# Patient Record
Sex: MALE | Race: ASIAN
[De-identification: names, ages, dates, MRNs, and addresses within clinical notes are randomized per-mention and may not be internally consistent; named-entity substitution may affect disease eponyms.]

---

## 2018-06-23 ENCOUNTER — HOSPITAL ENCOUNTER (EMERGENCY)
Dept: HOSPITAL 74 - JER | Age: 63
Discharge: HOME | End: 2018-06-23
Payer: COMMERCIAL

## 2018-06-23 VITALS — TEMPERATURE: 98.3 F | DIASTOLIC BLOOD PRESSURE: 65 MMHG | HEART RATE: 63 BPM | SYSTOLIC BLOOD PRESSURE: 141 MMHG

## 2018-06-23 VITALS — BODY MASS INDEX: 37.9 KG/M2

## 2018-06-23 DIAGNOSIS — E66.9: ICD-10-CM

## 2018-06-23 DIAGNOSIS — Z79.82: ICD-10-CM

## 2018-06-23 DIAGNOSIS — I25.10: ICD-10-CM

## 2018-06-23 DIAGNOSIS — K61.1: Primary | ICD-10-CM

## 2018-06-23 DIAGNOSIS — Z95.5: ICD-10-CM

## 2018-06-23 DIAGNOSIS — I10: ICD-10-CM

## 2018-06-23 DIAGNOSIS — Z95.0: ICD-10-CM

## 2018-06-23 DIAGNOSIS — E78.00: ICD-10-CM

## 2018-06-23 PROCEDURE — 0D9Q0ZZ DRAINAGE OF ANUS, OPEN APPROACH: ICD-10-PCS

## 2018-06-23 NOTE — CONSULT
Consult


Consult Specialty:: general surgery 


Reason for Consultation:: tender perianal mass





- History of Present Illness


Chief Complaint: perianal pain


History of Present Illness: 





61 yo male with PMH MI s/p 1 stent, pacemaker, HTN, obesity and HLD present to 

the emergency department with swelling near the anus region for the past two 

days. The patient reports a progressively worsening constant pain that is 

accompanied with burning. The patient reports he followed up with Dr. Ashish Jacob (Gastroenterologist) office, where he had a colonoscopy, states he 

was prescribed corticosteroid suppository and was recommended to use milk of 

magnesia. The patient states s/p the use of corticosteroid his symptoms 

worsened. The patient reports contacting Dr. Hudson yesterday, who recommended 

him to the ED if the pain worsened.  The patient reports an history of 

occasional constipation. we were asked to assess. 








- History Source


History Provided By: Patient, Medical Record


Limitations to Obtaining History: No Limitations





- Past Medical History


Cardio/Vascular: Yes: CAD, HTN, Hyperlipdemia





- Past Surgical History


Past Surgical History: Yes: Stent





- Alcohol/Substance Use


Hx Alcohol Use: No





- Smoking History


Smoking history: Never smoked


Have you smoked in the past 12 months: No





- Social History


History of Recent Travel: Yes





Home Medications





- Home Medications


Home Medications: 


Ambulatory Orders





Aspirin [ASA -] 81 mg PO DAILY 06/23/18 


Candesartan Cilexetil [Atacand -] 16 mg PO DAILY 06/23/18 


Ezetimibe [Zetia] 10 mg PO DAILY 06/23/18 


Metoprolol Tartrate [Lopressor -] 25 mg PO DAILY 06/23/18 











Review of Systems





- Review of Systems


Constitutional: denies: Chills, Fever


Eyes: denies: Blurred Vision, Recent Change in Vision


HENT: denies: Difficult Swallowing, Throat Pain


Cardiovascular: denies: Chest Pain, Palpitations


Respiratory: denies: Cough, SOB


Gastrointestinal: denies: Abdominal Pain


Genitourinary: denies: Discharge, Dysuria


Breasts: reports: No Symptoms Reported.  denies: Pain


Musculoskeletal: reports: Back Pain


Integumentary: denies: Erythema, Lesions, Rash


Neurological: denies: Seizure, Syncope


Endocrine: denies: Unexplained Weight Gain, Unexplained Weight Loss


Hematology/Lymphatic: denies: Easily Bruised, Excessive Bleeding


Psychiatric: denies: Anxiety, Depression





Physical Exam


Vital Signs: 


 Vital Signs











Temperature  98.3 F   06/23/18 15:27


 


Pulse Rate  63   06/23/18 15:27


 


Respiratory Rate  17   06/23/18 15:27


 


Blood Pressure  141/65   06/23/18 15:27


 


O2 Sat by Pulse Oximetry (%)  96   06/23/18 15:27











Constitutional: Yes: Well Nourished, No Distress, Calm


Eyes: Yes: Conjunctiva Clear, EOM Intact


HENT: Yes: Atraumatic, Normocephalic


Neck: Yes: Supple, Trachea Midline


Cardiovascular: Yes: Regular Rate and Rhythm, S1, S2


Respiratory: Yes: Regular, CTA Bilaterally


Gastrointestinal: Yes: Normal Bowel Sounds, Soft.  No: Tenderness


...Rectal Exam: Yes: Hemorrhoids/External, Sphincter Tone Normal, Other (tender 

mass at)


Renal/: No: CVA Tenderness - Left, CVA Tenderness - Right


Musculoskeletal: No: Muscle Pain, Muscle Weakness


Extremities: No: Cold, Cool


Integumentary: No: Jaundice, Rash


Neurological: Yes: Alert, Oriented


Psychiatric: Yes: Alert, Oriented





Problem List





- Problems


(1) Perianal abscess


Assessment/Plan: 


63yo male MMP with perianal abscess 





Bedside I&D of perianal abscess 


adequate analgesia 


sits baths after elimination 


f/u this Wednesday in [facility] for wound check


Discussed with patient risks, benefits and alternatives of I&D of perianal 

abscess, including but not limited to bleeding, infection, injury to adjacent 

structures, leak or injury, intraabdominal abscess, need for further procedures

, death; alternatives include antibiotics, delayed or no surgery - risks of 

this include failure of nonoperative therapy, perforation, sepsis, recurrence, 

death.


Patient desires to proceed with operation - will take to OR for above. Informed 

consent signed for same.


Code(s): K61.0 - ANAL ABSCESS   





(2) HLD (hyperlipidemia)


Code(s): E78.5 - HYPERLIPIDEMIA, UNSPECIFIED   


Qualifiers: 


   Hyperlipidemia type: unspecified   Qualified Code(s): E78.5 - Hyperlipidemia

, unspecified   





(3) HTN (hypertension)


Code(s): I10 - ESSENTIAL (PRIMARY) HYPERTENSION   


Qualifiers: 


   Hypertension type: essential hypertension   Qualified Code(s): I10 - 

Essential (primary) hypertension   





(4) CAD (coronary atherosclerotic disease)


Code(s): I25.10 - ATHSCL HEART DISEASE OF NATIVE CORONARY ARTERY W/O ANG PCTRS 

  





(5) Obesity (BMI 30-39.9)


Code(s): E66.9 - OBESITY, UNSPECIFIED

## 2018-06-23 NOTE — PROC
Incision and Drainage


Indication/Location: Perianal abscess at 1 oclock


Risks and Benefits Explained: Yes


Consent on Chart: Yes


Betadine cleansed: Yes


Anesthesia: 2% Lidocaine


Blade Size: 10


Drainage: purulent material 10ml


Irrigated with Normal Saline: Yes


Iodinated Packin in


Plain packing: Yes





- Remarks


Remarks: 





standard I&D, return of foul smelling pus

## 2018-06-23 NOTE — PDOC
History of Present Illness





- General


History Source: Patient


Exam Limitations: No Limitations





- History of Present Illness


Initial Comments: 





06/23/18 17:00


The patient is a 62-year-old male with past medical history of heart attack (s/

p 1 stent), pacemaker, HTN and HLD present to the emergency department with 

swelling near the anus region for the past two days. The patient reports a 

progressively worsening constant pain that is accompanied with burning. The 

patient reports he followed up with Dr. Ashish Jacob (Gastroenterologist) 

office, where he had a colonoscopy, states he was prescribed corticosteroid 

suppository and was recommended to use milk of magnesia. The patient states s/p 

the use of corticosteroid his symptoms worsened. The patient reports contacting 

Dr. Hudson yesterday, who recommended him to the ED if the pain worsened.  The 

patient reports an history of occasional constipation. 





The patient denies a prior similar incident. Denies hematochezia.  Denies 

diarrhea or constipation. Denies fever, chills, cough or a headache. Denies 

chest pain, shortness of breath or palpitation. Denies back pain. Denies dysuria

, hematuria, frequency or urgency to urinate. 





Allergies: NKDA


Social history: None reported.


Surgical history: Cardiac stent and pacemaker.  


PCP: Nusrat Avalos MD 








<Jennifer Acevedo - Last Filed: 06/23/18 17:50>





<Byron Mishra - Last Filed: 06/23/18 18:59>





- General


Chief Complaint: Hemorrhoids


Stated Complaint: Rectal PAIN


Time Seen by Provider: 06/23/18 16:17





Past History





<Jennifer Acevedo - Last Filed: 06/23/18 17:50>





- Past Medical History


CVA: No


COPD: No


DVT: No


Dementia: No





- Surgical History


Cardiac Surgery: Yes (PACEMAKER)





- Immunization History


Immunization Up to Date: Yes





- Suicide/Smoking/Psychosocial Hx


Smoking History: Never smoked


Have you smoked in the past 12 months: No


Information on smoking cessation initiated: No


Hx Alcohol Use: No


Drug/Substance Use Hx: No


Substance Use Type: None





<Byron Mishra - Last Filed: 06/23/18 18:59>





- Past Medical History


Home Medications: 


Ambulatory Orders





Aspirin [ASA -] 81 mg PO DAILY 06/23/18 


Candesartan Cilexetil [Atacand -] 16 mg PO DAILY 06/23/18 


Ezetimibe [Zetia] 10 mg PO DAILY 06/23/18 


Metoprolol Tartrate [Lopressor -] 25 mg PO DAILY 06/23/18 











**Review of Systems





- Review of Systems


Able to Perform ROS?: Yes


Comments:: 





06/23/18 16:52


CONSTITUTIONAL: No fever, no chills, no fatigue


EYES: No visual changes


ENT: No ear pain, no sore throat


CARDIOVASCULAR: No chest pain, no palpitations


RESPIRATORY: No cough, no SOB


GI: No abdominal pain, no nausea, no vomiting, no constipation, no diarrhea


GENITOURINARY: (+) Swelling to the anus region. No dysuria, no frequency, no 

hematuria


MUSKULOSKELETAL: No backpain, no joint pain, no myalgias


SKIN: No rash


NEURO: No headache














<Jennifer Acevedo - Last Filed: 06/23/18 17:50>





*Physical Exam





- Vital Signs


 Last Vital Signs











Temp Pulse Resp BP Pulse Ox


 


 98.3 F   63   17   141/65   96 


 


 06/23/18 15:27  06/23/18 15:27  06/23/18 15:27  06/23/18 15:27  06/23/18 15:27














- Physical Exam


Comments: 





06/23/18 16:53





CONSTITUTIONAL: Well-appearing; well-nourished; in no apparent distress


HEAD: Normocephalic; atraumatic


EYES: PERRL; EOM intact


ENMT: External appears normal; normal oropharynx


NECK: Supple; non-tender; no cervical lymphadenopathy


CARD: Normal S1, S2; no murmurs, rubs, or gallops


RESP: Normal chest excursion with respiration; breath sounds clear and equal 

bilaterally; no wheezes, rhonchi, or rales








EXT: Normal ROM in all four extremities; non-tender to palpation; distal pulses 

intact


SKIN: Warm, dry, no rash


NEURO: No focal neurological deficiencies.





<Jennifer Acevedo - Last Filed: 06/23/18 17:50>





- Vital Signs


 Last Vital Signs











Temp Pulse Resp BP Pulse Ox


 


 98.3 F   63   17   141/65   96 


 


 06/23/18 15:27  06/23/18 15:27  06/23/18 15:27  06/23/18 15:27  06/23/18 15:27














- Physical Exam


Comments: 








06/23/18 17:59


GI:  soft, nt, nd, bs-nl, no hernias;  VA: + large soft tissue mass to the maria elena-

anal area, ttp.  no rectal masses.











<Byron Mishra - Last Filed: 06/23/18 18:59>





Medical Decision Making





- Medical Decision Making





06/23/18 17:51


Call sent to the on-call doctor at [facility] @ [phone]. 


[facility] called at 5:20 pm 


[facility] called at 5:47 pm. 


Case discussed with Dr. Maldonado at 5:52 PM. 








<Jennifer Acevedo - Last Filed: 06/23/18 17:50>





- Medical Decision Making








06/23/18 18:57


62-year-old male with history of CAD, hypertension presented to the ER with a 

painful perianal mass. Dr. Smith of surgery evaluated the patient at bedside 

and performed incision and drainage of a perianal abscess. Packing was inserted 

abscess cavity was irrigated with normal saline. Gauze dressing applied. 

Patient instructed on wound care and will be discharged with follow-up.





<Byron Mishra - Last Filed: 06/23/18 18:59>





*DC/Admit/Observation/Transfer





- Attestations


Scribe Attestion: 





06/23/18 16:53





Documentation prepared by Jennifer Acevedo, acting as medical scribe for Byron Mishra MD. 











<Jennifer Acevedo - Last Filed: 06/23/18 17:50>





- Attestations


Physician Attestion: 





06/23/18 17:59


The documentation was prepared by the scribe under my direct supervision. I 

have reviewed the documentation which correctly represents the findings, 

medical decision-making and critical action taken by me.





<Byron Mishra - Last Filed: 06/23/18 18:59>


Diagnosis at time of Disposition: 


 Perianal abscess








- Discharge Dispostion


Disposition: HOME


Condition at time of disposition: Stable





- Referrals


Referrals: 


Nusrat Hudson MD [Primary Care Provider] - 


Evens Buckley MD [Staff Physician] - 





- Patient Instructions


Printed Discharge Instructions:  DI for Anal Abscess





- Post Discharge Activity

## 2018-06-27 NOTE — PDOC
Patient Follow-up (Call Back)





- Post ED Follow - Up


Condition at time of discharge: Stable


Disposition at time of original discharge: HOME


Reason for Call Back: Abnwl. Microbiology ((+) wound culture.  Patient had 

perianal abscess drained by Dr. Buckley.  Patient has follow up with Dr. Buckley 

today.  Will not call out abx yet.  WIll wait for follow up note.)

## 2018-07-28 ENCOUNTER — HOSPITAL ENCOUNTER (OUTPATIENT)
Dept: HOSPITAL 74 - JER | Age: 63
Setting detail: OBSERVATION
LOS: 2 days | Discharge: HOME | End: 2018-07-30
Attending: INTERNAL MEDICINE | Admitting: INTERNAL MEDICINE
Payer: COMMERCIAL

## 2018-07-28 VITALS — BODY MASS INDEX: 37.5 KG/M2

## 2018-07-28 DIAGNOSIS — E78.5: ICD-10-CM

## 2018-07-28 DIAGNOSIS — Z95.0: ICD-10-CM

## 2018-07-28 DIAGNOSIS — I25.10: ICD-10-CM

## 2018-07-28 DIAGNOSIS — R60.0: ICD-10-CM

## 2018-07-28 DIAGNOSIS — I10: ICD-10-CM

## 2018-07-28 DIAGNOSIS — E87.1: Primary | ICD-10-CM

## 2018-07-28 DIAGNOSIS — Z79.82: ICD-10-CM

## 2018-07-28 DIAGNOSIS — G47.33: ICD-10-CM

## 2018-07-28 DIAGNOSIS — Z95.5: ICD-10-CM

## 2018-07-28 DIAGNOSIS — I25.2: ICD-10-CM

## 2018-07-28 LAB
ALBUMIN SERPL-MCNC: 3.6 G/DL (ref 3.4–5)
ALP SERPL-CCNC: 50 U/L (ref 45–117)
ALT SERPL-CCNC: 28 U/L (ref 12–78)
ANION GAP SERPL CALC-SCNC: 5 MMOL/L (ref 8–16)
APPEARANCE UR: CLEAR
AST SERPL-CCNC: 31 U/L (ref 15–37)
BASOPHILS # BLD: 0.8 % (ref 0–2)
BILIRUB SERPL-MCNC: 0.7 MG/DL (ref 0.2–1)
BILIRUB UR STRIP.AUTO-MCNC: NEGATIVE MG/DL
BNP SERPL-MCNC: 136.73 PG/ML (ref 5–125)
BUN SERPL-MCNC: 7 MG/DL (ref 7–18)
CALCIUM SERPL-MCNC: 8.3 MG/DL (ref 8.5–10.1)
CHLORIDE SERPL-SCNC: 89 MMOL/L (ref 98–107)
CO2 SERPL-SCNC: 32 MMOL/L (ref 21–32)
COLOR UR: (no result)
CREAT SERPL-MCNC: 1 MG/DL (ref 0.7–1.3)
CREAT UR-MCNC: 37.6 MG/DL (ref 20–370)
DEPRECATED RDW RBC AUTO: 14 % (ref 11.9–15.9)
EOSINOPHIL # BLD: 6.2 % (ref 0–4.5)
GLUCOSE SERPL-MCNC: 88 MG/DL (ref 74–106)
HCT VFR BLD CALC: 43.5 % (ref 35.4–49)
HGB BLD-MCNC: 15 GM/DL (ref 11.7–16.9)
KETONES UR QL STRIP: NEGATIVE
LEUKOCYTE ESTERASE UR QL STRIP.AUTO: NEGATIVE
LYMPHOCYTES # BLD: 32.7 % (ref 8–40)
MCH RBC QN AUTO: 29.9 PG (ref 25.7–33.7)
MCHC RBC AUTO-ENTMCNC: 34.5 G/DL (ref 32–35.9)
MCV RBC: 86.9 FL (ref 80–96)
MONOCYTES # BLD AUTO: 13.1 % (ref 3.8–10.2)
NEUTROPHILS # BLD: 47.2 % (ref 42.8–82.8)
NITRITE UR QL STRIP: NEGATIVE
OSMOLALITY SERPL: 268 MOSM/KG (ref 278–305)
PH UR: 7 [PH] (ref 5–8)
PLATELET # BLD AUTO: 233 K/MM3 (ref 134–434)
PMV BLD: 6.5 FL (ref 7.5–11.1)
POTASSIUM SERPLBLD-SCNC: 4.6 MMOL/L (ref 3.5–5.1)
PROT SERPL-MCNC: 6.9 G/DL (ref 6.4–8.2)
PROT UR QL STRIP: NEGATIVE
PROT UR QL STRIP: NEGATIVE
PROT UR STRIP-MCNC: < 5 MG/DL
RBC # BLD AUTO: 5.01 M/MM3 (ref 4–5.6)
SODIUM SERPL-SCNC: 126 MMOL/L (ref 136–145)
SP GR UR: 1 (ref 1–1.03)
UROBILINOGEN UR STRIP-MCNC: NEGATIVE MG/DL (ref 0.2–1)
WBC # BLD AUTO: 5.4 K/MM3 (ref 4–10)

## 2018-07-28 PROCEDURE — G0378 HOSPITAL OBSERVATION PER HR: HCPCS

## 2018-07-28 PROCEDURE — 3E033GC INTRODUCTION OF OTHER THERAPEUTIC SUBSTANCE INTO PERIPHERAL VEIN, PERCUTANEOUS APPROACH: ICD-10-PCS | Performed by: INTERNAL MEDICINE

## 2018-07-28 PROCEDURE — A9502 TC99M TETROFOSMIN: HCPCS

## 2018-07-28 RX ADMIN — HEPARIN SODIUM SCH UNIT: 5000 INJECTION, SOLUTION INTRAVENOUS; SUBCUTANEOUS at 21:14

## 2018-07-28 RX ADMIN — PANTOPRAZOLE SODIUM SCH MG: 40 INJECTION, POWDER, FOR SOLUTION INTRAVENOUS at 18:31

## 2018-07-28 RX ADMIN — ATORVASTATIN CALCIUM SCH MG: 20 TABLET, FILM COATED ORAL at 21:12

## 2018-07-28 NOTE — HP
CHIEF COMPLAINT: Weakness, abdominal/chest discomfort





PCP: Dr. Hudson  





HISTORY OF PRESENT ILLNESS:


The patient is a 61 yo m w/ PMH HTN, HLD, CAD s/p MI w/ stenting and PPM 

placement who was sent to the ED by his PCP for evaluation of weakness, 

diaphoresis and chest/abdominal discomfort. The patient states that he was 

laying in his couch this morning when he began to experience a discomfort which 

began in his epigastrium and radiated to the center of his chest. The patient 

described the sensation as a burning discomfort. The patient associates this 

sensation with SOB. The sensation had no exacerbating or alleviating factors 

and resolved after approx 2-3 minutes after the patient drank some water. The 

patient has had similar episodes of this discomfort in the past. He also states 

that these previous occurrences have been precipitated by exertion. The patient 

also endorses taking a large amout of NSAIDS during the past week for back 

pain. He reports taking approx 6 pills per day of mixed Advil and Motrin. The 

patient denies chest pain, abdominal pain, dysuria. 





ER course was notable for:


(1) Sodium 126, Cl 88


(2) troponin negative x2


(3)








PAST MEDICAL HISTORY:


see HPI





PAST SURGICAL HISTORY:


Stent placement 2007





Social History:


Smoking: former smoker. Quit 30 years ago


Alcohol: denies


Drugs: denies





Family History:


Allergies





No Known Allergies Allergy (Verified 07/28/18 10:50)


 








HOME MEDICATIONS:


 Home Medications











 Medication  Instructions  Recorded


 


Aspirin [ASA -] 81 mg PO DAILY 06/23/18


 


Candesartan Cilexetil [Atacand -] 16 mg PO DAILY 06/23/18


 


Ezetimibe [Zetia] 10 mg PO DAILY 06/23/18


 


Metoprolol Tartrate [Lopressor -] 25 mg PO DAILY 06/23/18


 


Pitavastatin Calcium [Livalo] 4 mg PO DAILY 07/28/18








REVIEW OF SYSTEMS


CONSTITUTIONAL: 


Absent:  fever, chills, diaphoresis, generalized weakness, malaise, loss of 

appetite, weight change


HEENT: 


Absent:  rhinorrhea, nasal congestion, throat pain, throat swelling, difficulty 

swallowing, mouth swelling, ear pain, eye pain, visual changes


CARDIOVASCULAR: 


Absent: syncope, palpitations, irregular heart rate, lightheadedness


RESPIRATORY: 


Absent: cough, orthopnea, wheezing, stridor, hemoptysis


GASTROINTESTINAL:


Absent: abdominal distension, nausea, vomiting, diarrhea, constipation, melena, 

hematochezia


GENITOURINARY: 


Absent: dysuria, frequency, urgency, hesitancy, hematuria, flank pain, genital 

pain


MUSCULOSKELETAL: 


Absent: myalgia, arthralgia, joint swelling, back pain, neck pain


SKIN: 


Absent: rash, itching, pallor


HEMATOLOGIC/IMMUNOLOGIC: 


Absent: easy bleeding, easy bruising, lymphadenopathy, frequent infections


ENDOCRINE:


Absent: unexplained weight gain, unexplained weight loss, heat intolerance, 

cold intolerance


NEUROLOGIC: 


Absent: headache, focal weakness or paresthesias, dizziness, unsteady gait, 

seizure, mental status changes, bladder or bowel incontinence


PSYCHIATRIC: 


Absent: anxiety, depression, suicidal or homicidal ideation, hallucinations.








PHYSICAL EXAMINATION


 Vital Signs - 24 hr











  07/28/18 07/28/18 07/28/18





  10:48 14:30 16:20


 


Temperature 97.9 F  


 


Pulse Rate 60  


 


Pulse Rate [  60 60





Apical]   


 


Respiratory 18  





Rate   


 


Blood Pressure 125/74  


 


Blood Pressure  134/73 136/85





[Left Arm]   


 


O2 Sat by Pulse 99 95 94 L





Oximetry (%)   











GENERAL: Awake, alert, and fully oriented, in no acute distress.


HEAD: Normal with no signs of trauma.


EYES: Pupils equal, round and reactive to light, extraocular movements intact, 

sclera anicteric, conjunctiva clear. No lid lag.


EARS, NOSE, THROAT: oropharynx clear without exudates. Moist mucous membranes.


NECK: Normal range of motion, supple without lymphadenopathy, JVD, or masses.


LUNGS: Breath sounds equal, clear to auscultation bilaterally. No wheezes, and 

no crackles. No accessory muscle use.


HEART: Regular rate and rhythm, normal S1 and S2 without murmur, rub or gallop.


ABDOMEN: Soft, nontender, not distended, normoactive bowel sounds, no guarding, 

no rebound, no masses.  No hepatomegaly or  splenomegaly. 


LOWER EXTREMITIES: 2+ pulses, warm, well-perfused. No calf tenderness. No 

peripheral edema. 


NEUROLOGICAL:  Cranial nerves II-X intact. Normal speech. strength 5/5 in all 4 

limbs


SKIN: Warm, dry, normal turgor, no rashes or lesions noted, normal capillary 

refill.


 


 Laboratory Results - last 24 hr











  07/28/18 07/28/18 07/28/18





  11:26 11:26 11:58


 


WBC  5.4  


 


RBC  5.01  


 


Hgb  15.0  


 


Hct  43.5  


 


MCV  86.9  


 


MCH  29.9  


 


MCHC  34.5  


 


RDW  14.0  


 


Plt Count  233  


 


MPV  6.5 L  


 


Absolute Neuts (auto)  2.6  


 


Neutrophils %  47.2  


 


Lymphocytes %  32.7  


 


Monocytes %  13.1 H  


 


Eosinophils %  6.2 H  


 


Basophils %  0.8  


 


Nucleated RBC %  0  


 


Sodium   126 L 


 


Potassium   4.6 


 


Chloride   89 L 


 


Carbon Dioxide   32 


 


Anion Gap   5 L 


 


BUN   7 


 


Creatinine   1.0 


 


Creat Clearance w eGFR   > 60 


 


Random Glucose   88 


 


Calcium   8.3 L 


 


Total Bilirubin   0.7 


 


AST   31 


 


ALT   28 


 


Alkaline Phosphatase   50 


 


Creatine Kinase   357 H 


 


Creatine Kinase Index   1.5 


 


CK-MB (CK-2)   5.61 H 


 


Troponin I   < 0.02 


 


B-Natriuretic Peptide   136.73 H 


 


Total Protein   6.9 


 


Albumin   3.6 


 


TSH   0.50 


 


Urine Color    Straw


 


Urine Appearance    Clear


 


Urine pH    7.0


 


Ur Specific Gravity    1.003


 


Urine Protein    Negative


 


Urine Glucose (UA)    Negative


 


Urine Ketones    Negative


 


Urine Blood    Negative


 


Urine Nitrite    Negative


 


Urine Bilirubin    Negative


 


Urine Urobilinogen    Negative


 


Ur Leukocyte Esterase    Negative


 


Ur Random Sodium   


 


Urine Creatinine   














  07/28/18





  11:58


 


WBC 


 


RBC 


 


Hgb 


 


Hct 


 


MCV 


 


MCH 


 


MCHC 


 


RDW 


 


Plt Count 


 


MPV 


 


Absolute Neuts (auto) 


 


Neutrophils % 


 


Lymphocytes % 


 


Monocytes % 


 


Eosinophils % 


 


Basophils % 


 


Nucleated RBC % 


 


Sodium 


 


Potassium 


 


Chloride 


 


Carbon Dioxide 


 


Anion Gap 


 


BUN 


 


Creatinine 


 


Creat Clearance w eGFR 


 


Random Glucose 


 


Calcium 


 


Total Bilirubin 


 


AST 


 


ALT 


 


Alkaline Phosphatase 


 


Creatine Kinase 


 


Creatine Kinase Index 


 


CK-MB (CK-2) 


 


Troponin I 


 


B-Natriuretic Peptide 


 


Total Protein 


 


Albumin 


 


TSH 


 


Urine Color 


 


Urine Appearance 


 


Urine pH 


 


Ur Specific Gravity 


 


Urine Protein  < 5


 


Urine Glucose (UA) 


 


Urine Ketones 


 


Urine Blood 


 


Urine Nitrite 


 


Urine Bilirubin 


 


Urine Urobilinogen 


 


Ur Leukocyte Esterase 


 


Ur Random Sodium  33


 


Urine Creatinine  37.6











ASSESSMENT/PLAN:


The patient is a 61 yo m w/ PMH HTN, CAD s/p MI and stenting who comes into the 

ED c/o abdominal and chest discomfort.





#Chest/abdominal discomfort possibly 2/2 GERD  r/o ACS


   -trop negative x2, Rpt in AM


   -EKG with no significant change from previous


   -Protonix 40mg daily


   -Rpt EKG


   -cardio consult





#Hyponatremia possibly 2/2 fluid overload vs diuretic use 


   -Serum mOsm


   -urine mOsm


   -urine lytes


   -fluid restrict 1L


   -Holding diuretics until fluid status assessed





#DEZ


   -patient airway severely restricted


   -Mallampati 5


   -No SOB at this time, but was observed snoring while awake and sitting up in 

bed


   -BiPap in PM according to patient's sleep study from 1/13/18





#FEN


   -no fluids indicated; fluid restriction


   -monitor sodium as above


   -regular diet





#Prophy


   -Heparin SQ 5k units TID





#Dispo


   -admit tele obs


   -medications to be reconciled with [facility] ([phone])





Visit type





- Emergency Visit


Emergency Visit: Yes


ED Registration Date: 07/28/18


Care time: The patient presented to the Emergency Department on the above date 

and was hospitalized for further evaluation of their emergent condition.





- New Patient


This patient is new to me today: Yes


Date on this admission: 07/29/18





- Critical Care


Critical Care patient: No





Hospitalist Screening





- Colonoscopy Questionnaire


Colonoscopy Questionnaire: 





Colonoscopy Questionnaire








-   Patient:


50 - 75 years old and never had a screening colonoscopy: Unknown


History of colon or rectal polyps, or CA: Unknown


History of IBD, Crohn's disease or UC: Unknown


History of abdominal radiation therapy as a child: Unknown





-   Relative:


1 with colon or rectal CA, or polyps at age 60 or younger: Unknown


Colon or rectal CA diagnosed at age 45 or younger: Unknown


Multiple relatives with colon or rectal CA: Unknown





-   Outcome:


Screening Result: Negative Screen

## 2018-07-28 NOTE — PN
Teaching Attending Note


Name of Resident: Brdoerick Carpenter





ATTENDING PHYSICIAN STATEMENT





I saw and evaluated the patient.


I reviewed the resident's note and discussed the case with the resident.


I agree with the resident's findings and plan as documented.








SUBJECTIVE:


CC: generalized weakness, and sweating x 3 min 


61 y/o gentleman with h/o HTN, HLP, CAD s/p MI and stenting, PPM, and severe 

obstructive sleep apnea  who presented with an episode of generalized weakness, 

and sweating  for 3 min this am, along with a weird burning feeling in his 

abdomen moving up and down his chest. during this episode he could not stay 

still or find a comfortable position.  He felt SOB and had HA during episode. 

denies palpitations , or chest pain , or fever /chills. episode happened while 

sitting on the couch. 


he reports runny nose few days ago, now resolved.  his Last MI many years ago, 

felt like chest pressure  with radiation to L arm and neck. 


He reports LARA and burning in his chest with exertion.  He was diagnosed with 

severe DEZ in Jan/18 and was prescribed BIPAP 25/21 but never got it. 


he reports LE edema for which he takes a water pill ( not sure of name). Edema 

has been stable. denies fever , chills, dysuria, cough , SOB, diarrhea , and 

visual changes. 


reports excessive  NSAIDS use for lower back pain 





OBJECTIVE:


NAD, awake , snoring while flat in bed. Obese full neck, possible palpable 

submandibular glands vs lymph noses. NO JVD. fullness of oropharynx with soft 

palate 


CV: RRR, nl S1, S2.


Lungs: CTAB 


Ext: 2+ edema . Area of hyperpigementation and increased warmth with varicose 

veins on  R medial leg. no fungal infection among toes. 


Neuro: EOMI, round equal pupils , reactive to light , no facial droop. tongue 

and uvula at mid line , facial sensation is nL. Strength 5/5 in upper and lower 

extremities proximally and distally. 





ASSESSMENT AND PLAN:





61 y/o gentleman with h/o HTN, HLP, MI, PPM, and severe obstructive sleep apnea

  who presented with an episode of generalized weakness, and sweating  for 3 

min. 


1- Episode of chest burning and generalized weakness. not clear of etiology. 

Anginal pain ( unstable angina) is in DDX , as well as GERD ( recent use of 

NSAIDs ) . 


unlikely he had arrhythmias.


- EKG with sinus rhythm, occasionally paced.


- repeat EKG and trop now 


- tele monitoring. 


- Not sure of last stress and echo. will d/w card if a stress test is needed 

given his exertional symptoms . If yes, will discuss timing ( inpt vs out pt ).





2-  Hyponatremia:  likely hypotonic hyponatremia. He is either euvolemic vs 

mild volume overload. Urine Na of 33 indicates renal loss from diuretics 

probably, rather than heart failure. 


- check urine os and serum Osm


- calculate feNA 


- restrict free water to 1 L a day 


- hold his diuretics 





3- LE edema: might have degree of R sided systolic dysfunction in the setting 

of severe untreated DEZ. 


- hold his  diuretics for now given hyponatremia. 


- will need an echo if not done recently ( can be done as out pt) 


- f/u with card 





4- Severe untreated DEZ: 


- Use BIPAP 25/21 q HS 


- f/u with pulm as out pt .


- he was advised not to drive in Jan 





5- Possible enlarged lymph nodes vs submandibular glands : w/u as out pt with 

ENT  


    


7- HTN: cont meds 


8- HLP , cont meds 





Dispo: Observation

## 2018-07-28 NOTE — PDOC
History of Present Illness





- General


History Source: Patient


Exam Limitations: No Limitations





- History of Present Illness


Initial Comments: 





07/28/18 11:54


The patient is a 62 year old male, with a significant past medical history of 

hypertension, hyperlipidemia, CAD, MI(2007), cardiac stents, pacemaker, sent to 

the emergency department by PCP Dr. Hudson for evaluation of weakness, tremors 

and diaphoresis earlier this morning. The patient reports waking up sweating 

and shaking earlier today. He reports his symptoms are not similar to his prior 

MI(had cp during that episode). Today, he reports some lower extremity edema, 

but denies any associated chest pain, shortness of breath, lightheadedness or 

palpitations. Patient reports nausea, but denies abdominal pain, vomiting, 

diarrhea, or constipation. He reports informing his wife, who gave him some 

water which relieved his nausea. He any fever, chills, weight loss, changes in 

appetite, headache, or dizziness. He denies any recent travel or sick contacts. 

He reports family history of hyperthyroidism. Patient is on Aspirin.





Allergies: NKDA


Past Surgical History: Cardiac stents, pacemaker


Social History: Non smoker. No ETOH or recreational drug use


PCP: Dr. Hudson


Cardiologist: Dr. Ibarra








<Julio Bran - Last Filed: 07/28/18 13:28>





<Kassie Nascimento - Last Filed: 07/28/18 15:13>





- General


Chief Complaint: Weakness


Stated Complaint: SENT BY PCP


Time Seen by Provider: 07/28/18 11:01





Past History





<Julio Bran - Last Filed: 07/28/18 13:28>





- Past Medical History


Cardiac Disorders: Yes


CVA: No


COPD: No


DVT: No


Dementia: No


HTN: Yes


Hypercholesterolemia: Yes





- Surgical History


Cardiac Surgery: Yes (PACEMAKER 2007)





- Immunization History


Immunization Up to Date: Yes





- Suicide/Smoking/Psychosocial Hx


Smoking History: Never smoked


Have you smoked in the past 12 months: No


Hx Alcohol Use: No


Drug/Substance Use Hx: No


Substance Use Type: None





<Kassie Nascimento - Last Filed: 07/28/18 15:13>





- Past Medical History


Allergies/Adverse Reactions: 


 Allergies











Allergy/AdvReac Type Severity Reaction Status Date / Time


 


No Known Allergies Allergy   Verified 07/28/18 10:50











Home Medications: 


Ambulatory Orders





Aspirin [ASA -] 81 mg PO DAILY 06/23/18 


Candesartan Cilexetil [Atacand -] 16 mg PO DAILY 06/23/18 


Ezetimibe [Zetia] 10 mg PO DAILY 06/23/18 


Metoprolol Tartrate [Lopressor -] 25 mg PO DAILY 06/23/18 


Pitavastatin Calcium [Livalo] 4 mg PO DAILY 07/28/18 











**Review of Systems





- Review of Systems


Able to Perform ROS?: Yes


Comments:: 





07/28/18 11:54


GENERAL/CONSTITUTIONAL: +Weakness. No fever or chills. 


HEAD, EYES, EARS, NOSE AND THROAT: No change in vision. No ear pain or 

discharge. No sore throat.


CARDIOVASCULAR: +Lower extremity edema, diaphoresis. No chest pain,  shortness 

of breath, palpitations.


RESPIRATORY: No wheezing, or hemoptysis.


GASTROINTESTINAL: +Nausea. No vomiting, diarrhea or constipation.


GENITOURINARY: No dysuria, frequency, or change in urination.


MUSCULOSKELETAL: No joint or muscle swelling or pain. No neck or back pain.


SKIN: No rash


NEUROLOGIC: No headache, vertigo, loss of consciousness, or change in sensation.


ENDOCRINE: No increased thirst. No abnormal weight change.


HEMATOLOGIC/LYMPHATIC: No anemia, easy bleeding, or history of blood clots.


ALLERGIC/IMMUNOLOGIC: No hives or skin allergy.








<Julio Bran - Last Filed: 07/28/18 13:28>





*Physical Exam





- Vital Signs


 Last Vital Signs











Temp Pulse Resp BP Pulse Ox


 


 97.9 F   60   18   125/74   99 


 


 07/28/18 10:48  07/28/18 10:48  07/28/18 10:48  07/28/18 10:48  07/28/18 10:48














- Physical Exam


Comments: 





07/28/18 11:54


GENERAL: Awake, alert, and fully oriented, in no acute distress


HEAD: No signs of trauma


EYES: PERRLA, EOMI, sclera anicteric, conjunctiva clear


ENT: Auricles normal inspection, hearing grossly normal, nares patent, 

oropharynx clear without exudates. Moist mucosa


NECK: Normal ROM, supple, no lymphadenopathy, JVD, or masses


LUNGS: Breath sounds equal, clear to auscultation bilaterally.  No wheezes, and 

no crackles


HEART: Paced. Regular rate and rhythm, normal S1 and S2, no murmurs, rubs or 

gallops


ABDOMEN: Soft, nontender, normoactive bowel sounds.  No guarding, no rebound.  

No masses


EXTREMITIES: Bilateral lower extremity edema. Normal range of motion. No 

clubbing or cyanosis. No cords, erythema, or tenderness


NEUROLOGICAL: Cranial nerves II through XII grossly intact.  Normal speech, 

normal gait


SKIN: Warm, Dry, normal turgor, no rashes or lesions noted.











<Julio Bran - Last Filed: 07/28/18 13:28>





- Vital Signs


 Last Vital Signs











Temp Pulse Resp BP Pulse Ox


 


 97.9 F   60   18   125/74   99 


 


 07/28/18 10:48  07/28/18 10:48  07/28/18 10:48  07/28/18 10:48  07/28/18 10:48














<Kassie Nascimento - Last Filed: 07/28/18 15:13>





**Heart Score/ECG Review





- History


History: Highly suspicious





- Electrocardiogram


EKG: Normal





- Age


Age: 45-65





- Risk Factors


Risk Factors Heart Score: Yes Hx Hypercholesterolemia, Yes Hx Hypertension, Yes 

Positive family hx of cardiac disease, Yes Hx Obesity


Based on the list above the patient has:: >/=3 risk factors or Hx 

atherosclerotic disease





- Troponin


Troponin: </= normal limit





- Score


Heart Score - Total: 5





<Kassie Nascimento - Last Filed: 07/28/18 15:13>





ED Treatment Course





- LABORATORY


CBC & Chemistry Diagram: 


 07/28/18 11:26





 07/28/18 11:26





- ADDITIONAL ORDERS


Additional order review: 


 











  07/28/18





  11:26


 


RBC  5.01


 


MCV  86.9


 


MCHC  34.5


 


RDW  14.0


 


MPV  6.5 L


 


Neutrophils %  47.2


 


Lymphocytes %  32.7


 


Monocytes %  13.1 H


 


Eosinophils %  6.2 H


 


Basophils %  0.8














<Julio Bran - Last Filed: 07/28/18 13:28>





- LABORATORY


CBC & Chemistry Diagram: 


 07/28/18 11:26





 07/28/18 11:26





<Kassie Nascimento - Last Filed: 07/28/18 15:13>





Medical Decision Making





- Medical Decision Making





07/28/18 13:28


First call placed to Dr. Ibarra at 13:28. Awaiting call back from Dr. Gates 

who is on call.


Case discussed with Dr. Gates at 13:29.





<Julio Bran - Last Filed: 07/28/18 13:28>





- Medical Decision Making








07/28/18 13:29


Case d/w Dr. Gates, covering Dr. Ibarra for cardiology. Recommended that we 

place him on obs for further workup. No prior echo results in [facility], however

, she will check outpatient records. 





07/28/18 14:41


Pt accepted to hospitalist service by Dr. Sumner.








<Kassie Nascimento - Last Filed: 07/28/18 15:13>





*DC/Admit/Observation/Transfer





- Attestations


Scribe Attestion: 





07/28/18 11:55





Documentation prepared by Julio Bran, acting as medical scribe for 

Kassie Nascimento MD.





<Julio Bran - Last Filed: 07/28/18 13:28>





- Discharge Dispostion


Decision to Admit order: Yes





<Kassie Nascimento - Last Filed: 07/28/18 15:13>


Diagnosis at time of Disposition: 


 Weakness, Hyponatremia





CAD (coronary atherosclerotic disease)


Qualifiers:


 Coronary Disease-Associated Artery/Lesion type: unspecified vessel or lesion 

type Native vs. transplanted heart: unspecified whether native or transplanted 

heart Associated angina: without angina Qualified Code(s): I25.10 - 

Atherosclerotic heart disease of native coronary artery without angina pectoris








- Discharge Dispostion


Condition at time of disposition: Stable

## 2018-07-29 VITALS — HEART RATE: 60 BPM

## 2018-07-29 LAB
ANION GAP SERPL CALC-SCNC: 6 MMOL/L (ref 8–16)
APTT BLD: 35.7 SECONDS (ref 25.2–36.5)
BUN SERPL-MCNC: 8 MG/DL (ref 7–18)
CALCIUM SERPL-MCNC: 8.2 MG/DL (ref 8.5–10.1)
CHLORIDE SERPL-SCNC: 95 MMOL/L (ref 98–107)
CO2 SERPL-SCNC: 29 MMOL/L (ref 21–32)
CREAT SERPL-MCNC: 0.8 MG/DL (ref 0.7–1.3)
DEPRECATED RDW RBC AUTO: 13.9 % (ref 11.9–15.9)
GLUCOSE SERPL-MCNC: 84 MG/DL (ref 74–106)
HCT VFR BLD CALC: 42.7 % (ref 35.4–49)
HGB BLD-MCNC: 15.2 GM/DL (ref 11.7–16.9)
INR BLD: 1.19 (ref 0.82–1.09)
MAGNESIUM SERPL-MCNC: 2.1 MG/DL (ref 1.8–2.4)
MCH RBC QN AUTO: 30.7 PG (ref 25.7–33.7)
MCHC RBC AUTO-ENTMCNC: 35.6 G/DL (ref 32–35.9)
MCV RBC: 86.3 FL (ref 80–96)
PHOSPHATE SERPL-MCNC: 3.8 MG/DL (ref 2.5–4.9)
PLATELET # BLD AUTO: 224 K/MM3 (ref 134–434)
PMV BLD: 6.9 FL (ref 7.5–11.1)
POTASSIUM SERPLBLD-SCNC: 4.7 MMOL/L (ref 3.5–5.1)
PT PNL PPP: 13.4 SEC (ref 9.7–13)
RBC # BLD AUTO: 4.95 M/MM3 (ref 4–5.6)
SODIUM SERPL-SCNC: 130 MMOL/L (ref 136–145)
WBC # BLD AUTO: 4.2 K/MM3 (ref 4–10)

## 2018-07-29 RX ADMIN — HEPARIN SODIUM SCH UNIT: 5000 INJECTION, SOLUTION INTRAVENOUS; SUBCUTANEOUS at 15:03

## 2018-07-29 RX ADMIN — VALSARTAN SCH MG: 160 TABLET, FILM COATED ORAL at 10:40

## 2018-07-29 RX ADMIN — EZETIMIBE SCH MG: 10 TABLET ORAL at 10:40

## 2018-07-29 RX ADMIN — METOPROLOL TARTRATE SCH MG: 25 TABLET, FILM COATED ORAL at 10:40

## 2018-07-29 RX ADMIN — PANTOPRAZOLE SODIUM SCH MG: 40 INJECTION, POWDER, FOR SOLUTION INTRAVENOUS at 10:40

## 2018-07-29 RX ADMIN — HEPARIN SODIUM SCH UNIT: 5000 INJECTION, SOLUTION INTRAVENOUS; SUBCUTANEOUS at 22:12

## 2018-07-29 RX ADMIN — HEPARIN SODIUM SCH UNIT: 5000 INJECTION, SOLUTION INTRAVENOUS; SUBCUTANEOUS at 05:48

## 2018-07-29 RX ADMIN — ATORVASTATIN CALCIUM SCH MG: 20 TABLET, FILM COATED ORAL at 22:12

## 2018-07-29 RX ADMIN — ASPIRIN 81 MG SCH MG: 81 TABLET ORAL at 10:40

## 2018-07-29 NOTE — PN
Progress Note (short form)





- Note


Progress Note: 





Subjective:


no fever or chills . no PC . feels better . cough . 





Objective:








Vital Signs:





 Last Vital Signs











Temp Pulse Resp BP Pulse Ox


 


 98.3 F   60   16   120/66   95 


 


 07/29/18 08:44  07/29/18 08:44  07/29/18 08:44  07/29/18 08:44  07/29/18 14:00








 Laboratory Results - last 24 hr











  07/28/18 07/29/18 07/29/18





  18:11 05:30 06:00


 


WBC    4.2


 


RBC    4.95


 


Hgb    15.2


 


Hct    42.7


 


MCV    86.3


 


MCH    30.7


 


MCHC    35.6


 


RDW    13.9


 


Plt Count    224


 


MPV    6.9 L


 


PT with INR   


 


INR   


 


PTT (Actin FS)   


 


Sodium   


 


Potassium   


 


Chloride   


 


Carbon Dioxide   


 


Anion Gap   


 


BUN   


 


Creatinine   


 


Creat Clearance w eGFR   


 


Random Glucose   


 


Serum Osmolality  268 L  


 


Calcium   


 


Phosphorus   


 


Magnesium   


 


Creatine Kinase  348 H  


 


Creatine Kinase Index  1.6  


 


CK-MB (CK-2)  5.63 H  


 


Troponin I  < 0.02  


 


Urine Osmolality   148 L 


 


Ur Random Sodium   34 


 


Ur Random Potassium   7.8 


 


Ur Random Chloride   16 














  07/29/18 07/29/18 07/29/18





  06:00 06:00 06:00


 


WBC   


 


RBC   


 


Hgb   


 


Hct   


 


MCV   


 


MCH   


 


MCHC   


 


RDW   


 


Plt Count   


 


MPV   


 


PT with INR  13.40 H  


 


INR  1.19 H  


 


PTT (Actin FS)  35.7  


 


Sodium   130 L 


 


Potassium   4.7 


 


Chloride   95 L 


 


Carbon Dioxide   29 


 


Anion Gap   6 L 


 


BUN   8 


 


Creatinine   0.8 


 


Creat Clearance w eGFR   > 60 


 


Random Glucose   84 


 


Serum Osmolality   


 


Calcium   8.2 L 


 


Phosphorus   3.8 


 


Magnesium   2.1 


 


Creatine Kinase    273


 


Creatine Kinase Index    1.7


 


CK-MB (CK-2)    4.78 H


 


Troponin I    < 0.02


 


Urine Osmolality   


 


Ur Random Sodium   


 


Ur Random Potassium   


 


Ur Random Chloride   














OBJECTIVE:


NAD, awake 


CV: RRR, nl S1, S2.


Lungs: CTAB 


Ext: 1+ edema . Area of hyperpigementation and increased warmth with varicose 

veins on  R medial leg. no fungal infection among toes. 





ASSESSMENT AND PLAN:





61 y/o gentleman with h/o HTN, HLP, MI, PPM, and severe obstructive sleep apnea

  who presented with an episode of generalized weakness, and sweating  for 3 

min. 


1- Episode of chest burning and generalized weakness. not clear of etiology. 

Anginal  VS GERD 


stress and echo tomorrow 





2- Hypotonic  Hyponatremia:  likely due to lasix use , Urine Na 33 . 


- restrict free water to 1 L a day 


- hold his diuretics . 





3- LE edema: might have degree of R sided systolic dysfunction in the setting 

of severe untreated DEZ. 


- hold his  diuretics for now given hyponatremia. stable . can resume at dc 


-echo 





4- Severe untreated DEZ: 


- Use BIPAP 25/21 q HS 


- f/u with pulm as out pt .


- he was advised not to drive again , until DEZ is treated 





5- Possible enlarged lymph nodes vs submandibular glands : w/u as out pt with 

ENT  


    


7- HTN: cont meds 


8- HLP , cont meds 





Dispo: depending on stress test results 





Visit type





- Emergency Visit


Emergency Visit: Yes


ED Registration Date: 07/28/18


Care time: The patient presented to the Emergency Department on the above date 

and was hospitalized for further evaluation of their emergent condition.





- New Patient


This patient is new to me today: No





- Critical Care


Critical Care patient: No

## 2018-07-29 NOTE — CON.CARD
Consult


Consult Specialty:: Cardiology


Referred by:: Judy


Reason for Consultation:: chest pain





- History of Present Illness


Chief Complaint: chest pain


History of Present Illness: 





62M h/o CAD s/p MI and PCI (2007 [facility] late presentation, failed lysis, s/p NALINI 

to LCx with residual prox RCA mod dz), s/p PPM ([facility] dual lead for sick sinus), 

HTN, HLD, DEZ p/w diaphoresis, chest and abdomen burning, headache.  Patient of 

Dr. Ibarra, last seen 6/2018.  Was sitting on the couch when symptmos started.

  Has been taking several NSAIDS for back pain over the last couple of weeks.  

of note also has had worsening dyspnea on exertion the last 2-3 weeks, prior he 

was exercising 30 minutes or more a few tmes a week.








- Past Medical History


Cardio/Vascular: Yes: CAD, HTN, Hyperlipdemia





- Past Surgical History


Past Surgical History: Yes: Stent





- Alcohol/Substance Use


Hx Alcohol Use: No





- Smoking History


Smoking history: Never smoked


Have you smoked in the past 12 months: No





- Social History


History of Recent Travel: Yes





Home Medications





- Allergies


Allergies/Adverse Reactions: 


 Allergies











Allergy/AdvReac Type Severity Reaction Status Date / Time


 


No Known Allergies Allergy   Verified 07/28/18 10:50














- Home Medications


Home Medications: 


Ambulatory Orders





Aspirin [ASA -] 81 mg PO DAILY 06/23/18 


Candesartan Cilexetil [Atacand -] 16 mg PO DAILY 06/23/18 


Ezetimibe [Zetia] 10 mg PO DAILY 06/23/18 


Metoprolol Tartrate [Lopressor -] 25 mg PO DAILY 06/23/18 


Pitavastatin Calcium [Livalo] 4 mg PO DAILY 07/28/18 











Review of Systems





- Review of Systems


Constitutional: reports: No Symptoms


Eyes: reports: No Symptoms


HENT: reports: No Symptoms


Neck: reports: No Symptoms


Cardiovascular: reports: Chest Pain, Shortness of Breath


Respiratory: reports: SOB on Exertion


Gastrointestinal: reports: Abdominal Pain


Musculoskeletal: reports: Back Pain


Neurological: reports: No Symptoms


Endocrine: reports: No Symptoms


Vital Signs: 


 Vital Signs











Temperature  98.3 F   07/29/18 08:44


 


Pulse Rate  60   07/29/18 08:44


 


Respiratory Rate  16   07/29/18 08:44


 


Blood Pressure  120/66   07/29/18 08:44


 


O2 Sat by Pulse Oximetry (%)  94 L  07/29/18 05:55











Constitutional: Yes: Well Nourished, No Distress


Eyes: Yes: Conjunctiva Clear, EOM Intact


HENT: Yes: Atraumatic, Normocephalic


Neck: Yes: Supple


Respiratory: Yes: Regular, CTA Bilaterally


Gastrointestinal: Yes: Normal Bowel Sounds, Soft


Cardiovascular: Yes: Regular Rate and Rhythm


JVD: No


Edema: No





- Other Data


Labs, Other Data: 


 CBC, BMP





 07/29/18 06:00 





 07/29/18 06:00 





 INR, PTT











INR  1.19  (0.82-1.09)  H  07/29/18  06:00    








 Troponin, BNP











  07/28/18 07/28/18 07/29/18





  11:26 18:11 06:00


 


Troponin I  < 0.02  < 0.02  < 0.02


 


B-Natriuretic Peptide  136.73 H  








 Troponin, BNP











  07/28/18 07/28/18 07/29/18





  11:26 18:11 06:00


 


Troponin I  < 0.02  < 0.02  < 0.02


 


B-Natriuretic Peptide  136.73 H  














Assessment/Plan


cath 5/2014 patent LCx site, mod dz prox to prior stent, mild residual dz





echo 10/2016 nl EF, inferolateral HK, RV tds but appears grossly normal, mild MR





stress mibi 12/2016 no ischemia or scar, nl EF





EKG 7/29/18 atrial paced, 60 bpm, no ischemic changes





62M h/o CAD s/p MI and PCI (2007 [facility] late presentation, failed lysis, s/p NALINI 

to LCxwith residual prox RCA mod dz), s/p PPM ([facility] dual lead for sick sinus), 

HTN, HLD, DEZ p/w diaphoresis, chest and abdomen burning, headache, worsening 

dyspnea on exertion








Chest discomfort, dyspnea on exertion


- trop negative, EKG stable, unlikely ACS


- has had worsening dyspnea on exertion as well as chest pain, although he does 

not think the chest pain is exertional


- given history of worsening [place] in setting of prior CAD hx, evaluate with 

echocardiogram and nuclear stress


- GERD on differential given recent NSAID use for back pain, GI following


- monitoring on tele





CAD


- cardiac workup as above, continue home aspirin, statin, bb





Edema


- chronic, stable


- holding home lasix in setting of hypnatremia





DEZ


- untreated, not using mask as prescribed








Hyponatremia


- free water restriction and holding diuretics per primary team





HTN


- stable on BB, arb





HLD


- stable on zetia, statin

## 2018-07-30 VITALS — TEMPERATURE: 97.7 F | DIASTOLIC BLOOD PRESSURE: 76 MMHG | SYSTOLIC BLOOD PRESSURE: 124 MMHG

## 2018-07-30 RX ADMIN — HEPARIN SODIUM SCH UNIT: 5000 INJECTION, SOLUTION INTRAVENOUS; SUBCUTANEOUS at 14:05

## 2018-07-30 RX ADMIN — HEPARIN SODIUM SCH UNIT: 5000 INJECTION, SOLUTION INTRAVENOUS; SUBCUTANEOUS at 05:48

## 2018-07-30 RX ADMIN — METOPROLOL TARTRATE SCH MG: 25 TABLET, FILM COATED ORAL at 14:06

## 2018-07-30 RX ADMIN — EZETIMIBE SCH MG: 10 TABLET ORAL at 14:06

## 2018-07-30 RX ADMIN — ASPIRIN 81 MG SCH MG: 81 TABLET ORAL at 14:05

## 2018-07-30 RX ADMIN — VALSARTAN SCH MG: 160 TABLET, FILM COATED ORAL at 14:05

## 2018-07-30 RX ADMIN — PANTOPRAZOLE SODIUM SCH MG: 40 INJECTION, POWDER, FOR SOLUTION INTRAVENOUS at 14:05

## 2018-07-30 NOTE — EKG
Test Reason : 

Blood Pressure : ***/*** mmHG

Vent. Rate : 060 BPM     Atrial Rate : 060 BPM

   P-R Int : 242 ms          QRS Dur : 100 ms

    QT Int : 420 ms       P-R-T Axes : 019 039 063 degrees

   QTc Int : 420 ms

 

Atrial-paced rhythm with prolonged AV conduction

ABNORMAL ECG

WHEN COMPARED WITH ECG OF 28-JUL-2018 11:58,

NO SIGNIFICANT CHANGE WAS FOUND

Confirmed by JESSE MCCLELLAN MD ([id number]) on 7/30/2018 10:21:47 AM

 

Referred By: ATHIRA JONES           Confirmed By:JESSE MCCLELLAN MD

## 2018-07-30 NOTE — EKG
Test Reason : 

Blood Pressure : ***/*** mmHG

Vent. Rate : 060 BPM     Atrial Rate : 060 BPM

   P-R Int : 228 ms          QRS Dur : 100 ms

    QT Int : 420 ms       P-R-T Axes : 030 032 055 degrees

   QTc Int : 420 ms

 

Atrial-paced rhythm with prolonged AV conduction

ABNORMAL ECG

WHEN COMPARED WITH ECG OF 10-FEB-2007 09:21,

ELECTRONIC ATRIAL PACEMAKER HAS REPLACED SINUS RHYTHM

 

Confirmed by JESSE MCCLELLAN MD ([id number]) on 7/30/2018 10:34:43 AM

 

Referred By:             Confirmed By:JESSE MCCLELLAN MD

## 2018-07-30 NOTE — PN
Progress Note (short form)





- Note


Progress Note: 





  


s: no cp dizzy palps, no events





tele: atrial paced


 Current Medications











Generic Name Dose Route Start Last Admin





  Trade Name Elvie  PRN Reason Stop Dose Admin


 


Aspirin  81 mg  07/29/18 10:00  07/29/18 10:40





  Asa -  PO   81 mg





  DAILY FANY   Administration





     





     





     





     


 


Atorvastatin Calcium  20 mg  07/28/18 22:00  07/29/18 22:12





  Lipitor -  PO   20 mg





  HS FANY   Administration





     





     





     





     


 


Dutasteride  0.5 mg  07/31/18 10:00  





  Avodart -  PO   





  DAILY FANY   





     





     





     





     


 


Ezetimibe  10 mg  07/29/18 10:00  07/29/18 10:40





  Zetia -  PO   10 mg





  DAILY FANY   Administration





     





     





     





     


 


Heparin Sodium (Porcine)  5,000 unit  07/28/18 22:00  07/30/18 05:48





  Heparin -  SQ   5,000 unit





  TID FANY   Administration





     





     





     





     


 


Metoprolol Tartrate  25 mg  07/29/18 10:00  07/29/18 10:40





  Lopressor -  PO   25 mg





  DAILY FANY   Administration





     





     





     





     


 


Pantoprazole Sodium  40 mg  07/28/18 17:45  07/29/18 10:40





  Protonix Iv  IVPUSH   40 mg





  DAILY FANY   Administration





     





     





     





     


 


Tamsulosin HCl  0.4 mg  07/31/18 10:00  





  Flomax -  PO   





  DAILY FANY   





     





     





     





     


 


Valsartan  160 mg  07/29/18 10:00  07/29/18 10:40





  Diovan -  PO   160 mg





  DAILY FANY   Administration





     





     





     





     











  Vital Signs











 Period  Temp  Pulse  Resp  BP Sys/Faria  Pulse Ox


 


 Last 24 Hr  97.6 F-98.9 F  60-63  16-20  124-138/63-78  95-99














Constitutional: Yes: Well Nourished, No Distress


Eyes: Yes: Conjunctiva Clear, EOM Intact


HENT: Yes: Atraumatic, Normocephalic


Neck: Yes: Supple


Respiratory: Yes: Regular, CTA Bilaterally


Gastrointestinal: Yes: Normal Bowel Sounds, Soft


Cardiovascular: Yes: Regular Rate and Rhythm


JVD: No


Edema: No








Assessment/Plan


cath 5/2014 patent LCx site, mod dz prox to prior stent, mild residual dz





echo 10/2016 nl EF, inferolateral HK, RV tds but appears grossly normal, mild MR








EKG 7/29/18 atrial paced, 60 bpm, no ischemic changes





echo 7/2018 nl LV function, EF 55-60%, no RWMA increased LAP and impaired 

relaxation, PPM in RV, RV not well visualized





stress mibi 7/2018 overall negative stress test, sm to mod zone of inf and 

inflat rev defect with mild intensity ischemia in infarcted zone





62M h/o CAD s/p MI and PCI (2007 [facility] late presentation, failed lysis, s/p NALINI 

to LCxwith residual prox RCA mod dz), s/p PPM ([facility] dual lead for sick sinus), 

HTN, HLD, DEZ p/w diaphoresis, chest and abdomen burning, headache, worsening 

dyspnea on exertion








Chest discomfort, dyspnea on exertion


- trop negative, EKG stable, unlikely ACS


- has had worsening dyspnea on exertion as well as chest pain, although he does 

not think the chest pain is exertional


- GERD on differential given recent NSAID use for back pain, GI following


- monitoring on tele


- echo unremarkable compared to prior


- stress test unremarkable, no further cardiac testing as inpatient








CAD


-  continue home aspirin, statin, bb


- echo and stress test unremarkable, follow up with Dr. Ibarra as outpatient 

after discharge





Edema


- chronic, stable


- holding home lasix in setting of hypnatremia





DEZ


- untreated, not using mask as prescribed








Hyponatremia


- free water restriction and holding diuretics per primary team





HTN


- stable on BB, arb





HLD


- stable on zetia, statin

## 2018-07-30 NOTE — DS
Physical Exam: 


SUBJECTIVE: Patient seen and examined at bedside. No acute events overnight.








OBJECTIVE:





 Vital Signs











 Period  Temp  Pulse  Resp  BP Sys/Faria  Pulse Ox


 


 Last 24 Hr  97.6 F-97.7 F  60-60  16-18  124-132/66-78  97-99








PHYSICAL EXAM





GENERAL: AAOx3. NAD. 


HEENT: AT/NC. EOMI. Moist mucus membranes.


NECK: Normal range of motion, supple without lymphadenopathy, JVD, or masses.


LUNGS: Breath sounds equal, clear to auscultation bilaterally. No wheezes, and 

no crackles. No accessory muscle use.


HEART: Regular rate and rhythm, normal S1 and S2 without murmur, rub or gallop.


ABDOMEN: Soft, nontender, not distended, normoactive bowel sounds, no guarding, 

no rebound, no masses.  No hepatomegaly or  splenomegaly. 


LOWER EXTREMITIES: 2+ pulses, warm, well-perfused. No calf tenderness. No 

peripheral edema. 


NEUROLOGICAL:  Cranial nerves II-X intact. Normal speech. strength 5/5 in all 4 

limbs


SKIN: Warm, dry, normal turgor, no rashes or lesions noted, normal capillary 

refill.





LABS


 Laboratory Results - last 24 hr











  07/30/18





  08:00


 


Sodium  132 L











HOSPITAL COURSE:





Date of Admission:07/28/18





cath 5/2014 patent LCx site, mod dz prox to prior stent, mild residual dz





echo 10/2016 nl EF, inferolateral HK, RV tds but appears grossly normal, mild MR





stress mibi 12/2016 no ischemia or scar, nl EF





EKG 7/29/18 atrial paced, 60 bpm, no ischemic changes





echo 7/2018 nl LV function, EF 55-60%, no RWMA increased LAP and impaired 

relaxation, PPM in RV, RV not well visualized





stress mibi 7/2018 overall negative stress test, sm to mod zone of inf and 

inflat rev defect with mild intensity ischemia in infarcted zone











62M w/ PMH HTN, CAD, PPM, s/p MI and stenting was admitted for c/o abdominal 

and chest discomfort. In the ED, trops were neg and ECG showed no significant 

change from previous ECG. Pt was consequently admitted to tele obs. Cardio was 

consulted for further workup. Pt was given Protonix given hx of GERD and recent 

use of NSAIDs. Blood work showed low sodium and physical exam showed lower 

extremity edema thus pt was fluid restricted for 1 day and home diuretics were 

also held. Pt's Na improved the following day with improvement in lower 

extremity edema as well. Per cardio, stress test was done that showed mild 

ischemia, but no acute changes that required immediate treatment. An echo was 

also ordered and revealed EF 55-60%, no RWMA increased LAP and impaired 

relaxation, PPM in RV, RV not well visualized. Pt was instructed to follow up 

with cardio outpatient. Pt had untreated DEZ and was subsequently put on CPAP. 

Pt tolerated CPAP and was told to follow up with a pulmonologist with specific 

instructions not to drive until he sees the doctor. Additionally, palpable 

lymph nodes were felt in pt's neck. He was instructed to follow up with an ENT 

outpatient for further evaluation. Upon discharge, pt's symptoms of abdominal 

pain and chest discomfort resolved. He was discharged with instructions to 

continue taking his home meds and to follow up with his specialists outpatient.











Date of Discharge: 07/30/18











Minutes to complete discharge: 35





Discharge Summary


Reason For Visit: ATHERSCLEROSIS,HYPONATREMIA,WEAKNESS


Current Active Problems





Hyponatremia (Acute)


CAD (coronary atherosclerotic disease) (Chronic)








Condition: Improved





- Instructions


Diet, Activity, Other Instructions: 


You were admitted for the treatment of your chest discomfort. You did not have 

a heart attack. You were seen by a cardiologist here. Your stress test showed 

some signs of blockage which need to be followed up on as an outpatient. You 

are safe for discharge home. 





You should resume taking your home medications as prescribed, including your 

Lasix. 





You should follow up with your heart doctor within one week.


You should follow up with your primary care physician within 1 week of 

discharge home. 


You should also follow up with a pulmonologist to have your CPAP settings 

adjusted. You should not drive until you see this doctor. 





You should have your blood drawn in one week to make sure that your sodium is 

returning to normal.





While you were in the hospital, we felt some lumps in your neck which may be 

lymph nodes. You should follow up with an ear, nose and throat specialist to 

have this evaluated.





If you begin to experience chest pain, shortness of breath, worsening abdominal 

pain, fevers, or chills, please call your primary chare physician or return to 

the emergency department. 


Referrals: 


Montana Farley MD [Staff Physician] - 


Nusrat Hudson MD [Primary Care Provider] - 


Carmelita Gates MD [Staff Physician] - 


Dinesh Desai MD [Staff Physician] - 


Dinesh Wise MD [Staff Physician] - 


Disposition: HOME





- Home Medications


Comprehensive Discharge Medication List: 


Ambulatory Orders





Aspirin [ASA -] 81 mg PO DAILY 06/23/18 


Candesartan Cilexetil [Atacand -] 16 mg PO DAILY 06/23/18 


Ezetimibe [Zetia] 10 mg PO DAILY 06/23/18 


Metoprolol Tartrate [Lopressor -] 25 mg PO DAILY 06/23/18 


Pitavastatin Calcium [Livalo] 4 mg PO DAILY 07/28/18 


Dutasteride 0.5 mg PO DAILY 07/30/18 


Furosemide 20 mg PO DAILY 07/30/18 


Miscellaneous Drug Not In Syst [Outpatient Lab Test] 1 each  ASDIR #1 misc 07/ 30/18 


Tamsulosin HCl 0.4 mg PO DAILY 07/30/18 








This patient is new to me today: Yes


Date on this admission: 07/30/18


Emergency Visit: Yes


ED Registration Date: 07/28/18


Care time: The patient presented to the Emergency Department on the above date 

and was hospitalized for further evaluation of their emergent condition.


Critical Care patient: No





- Discharge Referral


Referred to [facility] Yes


Physician Referral: Dinesh Desai MD (Pulm)

## 2018-07-30 NOTE — PN
Physical Exam: 


SUBJECTIVE: Patient seen and examined. No acute complaints overnight. Denies 

fever/chills, n/v, dizziness, headaches, urinary or bowel symptoms. As per nurse

, pt tolerated BiPAP overnight. 








OBJECTIVE:





 Vital Signs











 Period  Temp  Pulse  Resp  BP Sys/Faria  Pulse Ox


 


 Last 24 Hr  97.6 F-98.9 F  60-63  16-20  120-138/63-78  95-99











GENERAL: AAOx3. NAD. 


HEENT: AT/NC. EOMI. Moist mucus membranes.


NECK: Normal range of motion, supple without lymphadenopathy, JVD, or masses.


LUNGS: Breath sounds equal, clear to auscultation bilaterally. No wheezes, and 

no crackles. No accessory muscle use.


HEART: Regular rate and rhythm, normal S1 and S2 without murmur, rub or gallop.


ABDOMEN: Soft, nontender, not distended, normoactive bowel sounds, no guarding, 

no rebound, no masses.  No hepatomegaly or  splenomegaly. 


LOWER EXTREMITIES: 2+ pulses, warm, well-perfused. No calf tenderness. No 

peripheral edema. 


NEUROLOGICAL:  Cranial nerves II-X intact. Normal speech. strength 5/5 in all 4 

limbs


SKIN: Warm, dry, normal turgor, no rashes or lesions noted, normal capillary 

refill.














 Laboratory Results - last 24 hr











  07/29/18 07/29/18 07/29/18





  05:30 06:00 06:00


 


WBC   4.2 


 


RBC   4.95 


 


Hgb   15.2 


 


Hct   42.7 


 


MCV   86.3 


 


MCH   30.7 


 


MCHC   35.6 


 


RDW   13.9 


 


Plt Count   224 


 


MPV   6.9 L 


 


PT with INR    13.40 H


 


INR    1.19 H


 


PTT (Actin FS)    35.7


 


Sodium   


 


Potassium   


 


Chloride   


 


Carbon Dioxide   


 


Anion Gap   


 


BUN   


 


Creatinine   


 


Creat Clearance w eGFR   


 


Random Glucose   


 


Calcium   


 


Phosphorus   


 


Magnesium   


 


Creatine Kinase   


 


Creatine Kinase Index   


 


CK-MB (CK-2)   


 


Troponin I   


 


Urine Osmolality  148 L  


 


Ur Random Sodium  34  


 


Ur Random Potassium  7.8  


 


Ur Random Chloride  16  














  07/29/18 07/29/18





  06:00 06:00


 


WBC  


 


RBC  


 


Hgb  


 


Hct  


 


MCV  


 


MCH  


 


MCHC  


 


RDW  


 


Plt Count  


 


MPV  


 


PT with INR  


 


INR  


 


PTT (Actin FS)  


 


Sodium  130 L 


 


Potassium  4.7 


 


Chloride  95 L 


 


Carbon Dioxide  29 


 


Anion Gap  6 L 


 


BUN  8 


 


Creatinine  0.8 


 


Creat Clearance w eGFR  > 60 


 


Random Glucose  84 


 


Calcium  8.2 L 


 


Phosphorus  3.8 


 


Magnesium  2.1 


 


Creatine Kinase   273


 


Creatine Kinase Index   1.7


 


CK-MB (CK-2)   4.78 H


 


Troponin I   < 0.02


 


Urine Osmolality  


 


Ur Random Sodium  


 


Ur Random Potassium  


 


Ur Random Chloride  








Active Medications











Generic Name Dose Route Start Last Admin





  Trade Name Rikkiq  PRN Reason Stop Dose Admin


 


Aspirin  81 mg  07/29/18 10:00  07/29/18 10:40





  Asa -  PO   81 mg





  DAILY FANY   Administration





     





     





     





     


 


Atorvastatin Calcium  20 mg  07/28/18 22:00  07/29/18 22:12





  Lipitor -  PO   20 mg





  HS FANY   Administration





     





     





     





     


 


Ezetimibe  10 mg  07/29/18 10:00  07/29/18 10:40





  Zetia -  PO   10 mg





  DAILY FANY   Administration





     





     





     





     


 


Heparin Sodium (Porcine)  5,000 unit  07/28/18 22:00  07/30/18 05:48





  Heparin -  SQ   5,000 unit





  TID FANY   Administration





     





     





     





     


 


Metoprolol Tartrate  25 mg  07/29/18 10:00  07/29/18 10:40





  Lopressor -  PO   25 mg





  DAILY FANY   Administration





     





     





     





     


 


Pantoprazole Sodium  40 mg  07/28/18 17:45  07/29/18 10:40





  Protonix Iv  IVPUSH   40 mg





  DAILY FANY   Administration





     





     





     





     


 


Valsartan  160 mg  07/29/18 10:00  07/29/18 10:40





  Diovan -  PO   160 mg





  DAILY FANY   Administration





     





     





     





     











cath 5/2014 patent LCx site, mod dz prox to prior stent, mild residual dz





echo 10/2016 nl EF, inferolateral HK, RV tds but appears grossly normal, mild MR





stress mibi 12/2016 no ischemia or scar, nl EF





EKG 7/29/18 atrial paced, 60 bpm, no ischemic changes





ECHO: report pending











ASSESSMENT/PLAN:


The patient is a 63 yo m w/ PMH HTN, CAD s/p MI and stenting who comes into the 

ED c/o abdominal and chest discomfort.








#Chest/abdominal discomfort possibly 2/2 GERD  r/o ACS; trop neg x3; ECG w/ no 

significant changes from prior Chest pain resolved. Pt currently stable.


-cont Aspirin 81 mg PO QD


-cont Protonix 40mg PO QD


-f/u stress test report


-f/u cardio consult





#Hyponatremia possibly 2/2 fluid overload vs diuretic use; Improved Na 132 

today. (yesterday 130)


-fluid restrict 1L


-cont to hold diuretics, may be restarted at home after d/c





#DEZ; tolerated CPAP last night, no SOB.


-CPAP: 8/30


-f/u pulm outpatient





#HTN; BP controlled. 124/76 this AM.


-cont Valsartan 160 mg PO QD


-cont Metoprolol Tartrate 25 mg PO QD





#HLD


-cont Atorvastatin 20 mg PO HS


-cont Ezetimibe 10 mg PO QD





#DVT Ppx


-Heparin SQ 5k units TID





#FEN


-no fluids indicated; fluid restriction


-monitor sodium as above


-regular diet





Dispo


-cont to monitor on tele obs





Visit type





- Emergency Visit


Emergency Visit: Yes


ED Registration Date: 07/28/18


Care time: The patient presented to the Emergency Department on the above date 

and was hospitalized for further evaluation of their emergent condition.





- New Patient


This patient is new to me today: Yes


Date on this admission: 07/30/18





- Critical Care


Critical Care patient: No

## 2018-07-30 NOTE — ECHO
______________________________________________________________________________



Name: RODRÍGUEZ BRUSH                               Exam:Adult Echocardiogram

MRN: [id number]            Study Date: 07/30/2018 08:11 AM

Age: 62 yrs

______________________________________________________________________________



Reason For Study: dyspnea

Height: 65 in        Weight: 218 lb        BSA: 2.1 m2



______________________________________________________________________________



MMode/2D Measurements & Calculations

IVSd: 1.0 cm                                 Ao root diam: 3.3 cm

LVIDd: 5.3 cm                                LA dimension: 4.2 cm

LVIDs: 4.1 cm                                ACS: 1.8 cm

LVPWd: 1.0 cm

IVSs: 1.1 cm



______________________________________________

LVPWs: 0.87 cm                               EDV(Teich): 136.4 ml

                                             ESV(Teich): 72.5 ml



Doppler Measurements & Calculations

MV E max samy: 86.4 cm/sec                            Med Peak E' Samy: 4.7 cm/sec

MV A max samy: 84.6 cm/sec                            Med E/e': 18.3

MV E/A: 1.0                                          Lat Peak E' Samy: 7.6 cm/sec

                                                     Lat E/e': 11.4





______________________________________________________________________________

Procedure

A complete two-dimensional transthoracic echocardiogram was performed (2D, M-mode, Doppler and color 
flow

Doppler). Technically limited study.

Left Ventricle

The left ventricle is normal in size. Left ventricular systolic function is normal. Ejection Fraction
 = 55-

60%. Elevated E/E' suggests elevated LA pressure with underlying impaired relaxation. No regional wal
l motion

abnormalities noted.

Right Ventricle

The right ventricle is not well visualized. There is a pacemaker lead in the right ventricle. Right

ventricular function cannot be assessed due to poor image quality.

Atria

The left atrium is mildly dilated. Right atrium not well visualized.

Mitral Valve

There is mild mitral annular calcification. There is no mitral regurgitation noted.

Tricuspid Valve

The tricuspid valve is normal in structure and function. No tricuspid regurgitation.

Aortic Valve

There is mild aortic sclerosis.;. The aortic valve is trileaflet. The aortic valve opens well. No aor
tic

regurgitation is present.

Pulmonic Valve

The pulmonic valve is not well visualized.

Great Vessels

The aortic root is normal size.

Pericardium/Pleura

There is no pericardial effusion.



______________________________________________________________________________



Interpretation Summary

Technically limited study

The left ventricle is normal in size.

Left ventricular systolic function is normal.

No regional wall motion abnormalities noted.

Ejection Fraction = 55-60%.

Elevated E/E' suggests elevated LA pressure with underlying impaired relaxation

The right ventricle is not well visualized.

There is a pacemaker lead in the right ventricle.

Right ventricular function cannot be assessed due to poor image quality.

The left atrium is mildly dilated.

There is mild mitral annular calcification.

There is mild aortic sclerosis.;

There is no pericardial effusion.



Previous study is not available for comparison







Fabiano Rosenbaum MD 07/30/2018 02:20 PM

## 2018-07-30 NOTE — PN
Teaching Attending Note


Name of Resident: Claudia Bennett





ATTENDING PHYSICIAN STATEMENT





I saw and evaluated the patient.


I reviewed the resident's note and discussed the case with the resident.


I agree with the resident's findings and plan as documented.








SUBJECTIVE:


No fever or chills. used CPAP last night . 





OBJECTIVE:


NAD, awake 


CV: RRR, nl S1, S2.


Lungs: CTAB 


Ext: no edema  . Area of hyperpigementation and increased warmth with varicose 

veins on  R medial leg. no fungal infection among toes. 





ASSESSMENT AND PLAN:





63 y/o gentleman with h/o HTN, HLP, MI, PPM, and severe obstructive sleep apnea

  who presented with an episode of generalized weakness, and sweating  for 3 

min. 


1- Episode of chest burning and generalized weakness.


stress and echo pending read 





2- Hypotonic  Hyponatremia:  likely due to lasix use . much improved . can 

resumem lasix at dc with close monitoring for his electrolytes ,  


cont free water restriction 





3- LE edema: might have degree of R sided systolic dysfunction in the setting 

of severe untreated DEZ. resolved 


-echo pending . resume lasix at dc 





4- Severe untreated DEZ: 


- did not tolerate  BIPAP 25/21 q HS . will need to follow with pulm as out pt 





5- Possible enlarged lymph nodes vs submandibular glands : w/u as out pt with 

ENT  


    


7- HTN: cont meds 


8- HLP , cont meds 





dispo : pending stress and echo . might dc today

## 2019-05-31 ENCOUNTER — HOSPITAL ENCOUNTER (INPATIENT)
Dept: HOSPITAL 74 - JER | Age: 64
LOS: 3 days | Discharge: HOME | DRG: 641 | End: 2019-06-03
Attending: INTERNAL MEDICINE | Admitting: INTERNAL MEDICINE
Payer: COMMERCIAL

## 2019-05-31 VITALS — BODY MASS INDEX: 39.2 KG/M2

## 2019-05-31 DIAGNOSIS — E87.1: Primary | ICD-10-CM

## 2019-05-31 DIAGNOSIS — I25.10: ICD-10-CM

## 2019-05-31 DIAGNOSIS — J32.9: ICD-10-CM

## 2019-05-31 DIAGNOSIS — N40.0: ICD-10-CM

## 2019-05-31 DIAGNOSIS — I25.2: ICD-10-CM

## 2019-05-31 DIAGNOSIS — E83.51: ICD-10-CM

## 2019-05-31 DIAGNOSIS — E78.5: ICD-10-CM

## 2019-05-31 DIAGNOSIS — I10: ICD-10-CM

## 2019-05-31 DIAGNOSIS — Z95.0: ICD-10-CM

## 2019-05-31 DIAGNOSIS — E66.9: ICD-10-CM

## 2019-05-31 LAB
ALBUMIN SERPL-MCNC: 3.8 G/DL (ref 3.4–5)
ALP SERPL-CCNC: 49 U/L (ref 45–117)
ALT SERPL-CCNC: 35 U/L (ref 13–61)
ANION GAP SERPL CALC-SCNC: 6 MMOL/L (ref 8–16)
APPEARANCE UR: CLEAR
AST SERPL-CCNC: 45 U/L (ref 15–37)
BASOPHILS # BLD: 0.6 % (ref 0–2)
BILIRUB SERPL-MCNC: 0.8 MG/DL (ref 0.2–1)
BILIRUB UR STRIP.AUTO-MCNC: NEGATIVE MG/DL
BNP SERPL-MCNC: 62.6 PG/ML (ref 5–125)
BUN SERPL-MCNC: 7 MG/DL (ref 7–18)
BUN SERPL-MCNC: 8 MG/DL (ref 7–18)
BUN SERPL-MCNC: 9 MG/DL (ref 7–18)
CALCIUM SERPL-MCNC: 8.1 MG/DL (ref 8.5–10.1)
CALCIUM SERPL-MCNC: 8.1 MG/DL (ref 8.5–10.1)
CALCIUM SERPL-MCNC: 8.2 MG/DL (ref 8.5–10.1)
CHLORIDE SERPL-SCNC: 84 MMOL/L (ref 98–107)
CHLORIDE SERPL-SCNC: 86 MMOL/L (ref 98–107)
CHLORIDE SERPL-SCNC: 88 MMOL/L (ref 98–107)
CO2 SERPL-SCNC: 29 MMOL/L (ref 21–32)
CO2 SERPL-SCNC: 30 MMOL/L (ref 21–32)
CO2 SERPL-SCNC: 30 MMOL/L (ref 21–32)
COLOR UR: YELLOW
CREAT SERPL-MCNC: 0.9 MG/DL (ref 0.55–1.3)
DEPRECATED RDW RBC AUTO: 13.6 % (ref 11.9–15.9)
EOSINOPHIL # BLD: 4.5 % (ref 0–4.5)
GLUCOSE SERPL-MCNC: 83 MG/DL (ref 74–106)
GLUCOSE SERPL-MCNC: 87 MG/DL (ref 74–106)
GLUCOSE SERPL-MCNC: 87 MG/DL (ref 74–106)
HCT VFR BLD CALC: 44 % (ref 35.4–49)
HGB BLD-MCNC: 15.3 GM/DL (ref 11.7–16.9)
KETONES UR QL STRIP: NEGATIVE
LEUKOCYTE ESTERASE UR QL STRIP.AUTO: NEGATIVE
LYMPHOCYTES # BLD: 35.3 % (ref 8–40)
MCH RBC QN AUTO: 30 PG (ref 25.7–33.7)
MCHC RBC AUTO-ENTMCNC: 34.8 G/DL (ref 32–35.9)
MCV RBC: 86.4 FL (ref 80–96)
MONOCYTES # BLD AUTO: 11.5 % (ref 3.8–10.2)
NEUTROPHILS # BLD: 48.1 % (ref 42.8–82.8)
NITRITE UR QL STRIP: NEGATIVE
PH UR: 7.5 [PH] (ref 5–8)
PLATELET # BLD AUTO: 235 K/MM3 (ref 134–434)
PMV BLD: 6.6 FL (ref 7.5–11.1)
POTASSIUM SERPLBLD-SCNC: 4.5 MMOL/L (ref 3.5–5.1)
POTASSIUM SERPLBLD-SCNC: 4.5 MMOL/L (ref 3.5–5.1)
POTASSIUM SERPLBLD-SCNC: 4.6 MMOL/L (ref 3.5–5.1)
PROT SERPL-MCNC: 6.9 G/DL (ref 6.4–8.2)
PROT UR QL STRIP: NEGATIVE
PROT UR QL STRIP: NEGATIVE
RBC # BLD AUTO: 5.1 M/MM3 (ref 4–5.6)
SODIUM SERPL-SCNC: 120 MMOL/L (ref 136–145)
SODIUM SERPL-SCNC: 121 MMOL/L (ref 136–145)
SODIUM SERPL-SCNC: 124 MMOL/L (ref 136–145)
SP GR UR: 1.01 (ref 1.01–1.03)
UROBILINOGEN UR STRIP-MCNC: 0.2 MG/DL (ref 0.2–1)
WBC # BLD AUTO: 5.5 K/MM3 (ref 4–10)

## 2019-05-31 RX ADMIN — SODIUM CHLORIDE SCH MLS/HR: 9 INJECTION, SOLUTION INTRAVENOUS at 15:30

## 2019-05-31 NOTE — EKG
Test Reason : 

Blood Pressure : ***/*** mmHG

Vent. Rate : 060 BPM     Atrial Rate : 060 BPM

   P-R Int : 292 ms          QRS Dur : 090 ms

    QT Int : 420 ms       P-R-T Axes : 046 -20 085 degrees

   QTc Int : 420 ms

 

AV dual-paced rhythm with prolonged AV conduction

ABNORMAL ECG

 

Confirmed by CHRISTIANO CHAN MD ([id number]) on 5/31/2019 3:06:14 PM

 

Referred By: ROMÁN ROSALES           Confirmed By:CHRISTIANO CHAN MD

## 2019-05-31 NOTE — PDOC
Attending Attestation





- Resident


Resident Name: Kris Palmer





- ED Attending Attestation


I have performed the following: I have examined & evaluated the patient, The 

case was reviewed & discussed with the resident, I agree w/resident's findings 

& plan, Exceptions are as noted





- HPI


HPI: 





05/31/19 04:04


63y M htn, hl, cad sp PM and stents, presents with headache x 2 days that is 

gradual in onset, localized in the occiput, worse at night, associated with 

nausea/vomiting yesterday morning when he woke up. Pt notes the pain was more 

severe tonight and coldnt sleep so came to the ED for evlauation. pt endorses 

recent URI/cough (on abx) associated with nasal congestion.





Denies any cp, sob, fever/chills, vision changes, night sweats, numbness/

tingling/weakness, ear pain, neck pain. 





GENERAL: The patient is awake, alert, and fully oriented, Nontoxic - in no 

acute distress.


HEAD: Normocephalic, atraumatic.


EYES: extraocular movements intact, sclera anicteric, conjunctiva clear.


ENT: Normal voice,  Moist mucous membranes, tympanic membranes bilaterally are 

clear without erythema or bulging


NECK: Normal range of motion, supple, no mastoid tenderness, negative Brudzinski

's


LUNGS: Breath sounds equal, clear to auscultation bilaterally.  No wheezes, no 

rhonchi, no rales.


HEART: Regular rate and rhythm, normal S1 and S2 without murmur, rub or gallop.


ABDOMEN: Soft, nontender,  No guarding, no rebound.  No CVA tenderness


EXTREMITIES: Normal range of motion, 


NEUROLOGICAL: No facial assymetry, Normal speech, 


PSYCH: Normal mood, normal affect.


SKIN: Warm, Dry, normal turgor,





Suspect viral syndrome with a tension headache, will treat supportively


No signs of meningitis, ICH








- Physicial Exam


PE: 





05/31/19 08:00


see above





- Medical Decision Making





05/31/19 07:59


labs reviewed


noted for severe hyponatremia


ct head and repeat Na pending


signed out to day team to fu with results and dipsosition

## 2019-05-31 NOTE — PDOC
*Physical Exam





- Vital Signs


 Last Vital Signs











Temp Pulse Resp BP Pulse Ox


 


 98.1 F   68   17   129/65   98 


 


 05/31/19 06:15  05/31/19 06:15  05/31/19 06:15  05/31/19 06:15  05/31/19 06:15














ED Treatment Course





- LABORATORY


CBC & Chemistry Diagram: 


 05/31/19 04:00





 05/31/19 08:00





- ADDITIONAL ORDERS


Additional order review: 


 Laboratory  Results











  05/31/19





  04:00


 


Sodium  120 L


 


Potassium  4.6


 


Chloride  84 L


 


Carbon Dioxide  30


 


Anion Gap  6 L


 


BUN  8


 


Creatinine  0.9


 


Est GFR (CKD-EPI)AfAm  104.98


 


Est GFR (CKD-EPI)NonAf  90.58


 


Random Glucose  87


 


Calcium  8.1 L


 


Total Bilirubin  0.8


 


AST  45 H


 


ALT  35


 


Alkaline Phosphatase  49


 


B-Natriuretic Peptide  62.6


 


Total Protein  6.9


 


Albumin  3.8








 











  05/31/19





  04:00


 


RBC  5.10


 


MCV  86.4


 


MCHC  34.8


 


RDW  13.6


 


MPV  6.6 L


 


Neutrophils %  48.1


 


Lymphocytes %  35.3


 


Monocytes %  11.5 H


 


Eosinophils %  4.5


 


Basophils %  0.6














- Medications


Given in the ED: 


ED Medications














Discontinued Medications














Generic Name Dose Route Start Last Admin





  Trade Name Freq  PRN Reason Stop Dose Admin


 


Acetaminophen  1,000 mg  05/31/19 03:45  05/31/19 04:16





  Ofirmev Injection -  IVPB  05/31/19 03:46  1,000 mg





  ONCE ONE   Administration





     





     





     





     


 


Metoclopramide HCl  10 mg  05/31/19 05:29  05/31/19 05:52





  Reglan Injection -  IVPB  05/31/19 05:30  10 mg





  ONCE ONE   Administration





     





     





     





     


 


Sodium Chloride  1,000 ml  05/31/19 05:18  05/31/19 05:52





  Normal Saline -  IV  05/31/19 05:19  1,000 ml





  ONCE ONE   Administration





     





     





     





     














Medical Decision Making





- Medical Decision Making





05/31/19 07:36


Signout taken from Dr. Palmer. Patient is a 64 yo male w/ pmh of HTN, HLD, CAD, 

MI s/p cardiac stents w/ pacemaker who presents for evalaution of headache. 

Patient noted to have Na of 120. Given 1L NS. Currently pending Head CT and 

repeat BMP for dispo.





05/31/19 09:09


Head CT negative. Patient has continued hyponatremia despite hydration w/ NS. 

Patient will be admitted for further evaluation of etiology.





 Laboratory Results - last 24 hr











  05/31/19 05/31/19 05/31/19





  04:00 04:00 08:00


 


WBC  5.5  


 


RBC  5.10  


 


Hgb  15.3  


 


Hct  44.0  


 


MCV  86.4  


 


MCH  30.0  


 


MCHC  34.8  


 


RDW  13.6  


 


Plt Count  235  


 


MPV  6.6 L  


 


Absolute Neuts (auto)  2.6  


 


Neutrophils %  48.1  


 


Lymphocytes %  35.3  


 


Monocytes %  11.5 H  


 


Eosinophils %  4.5  


 


Basophils %  0.6  


 


Nucleated RBC %  0  


 


Sodium   120 L  121 L


 


Potassium   4.6  4.5


 


Chloride   84 L  86 L


 


Carbon Dioxide   30  29


 


Anion Gap   6 L  6 L


 


BUN   8  7


 


Creatinine   0.9  0.9


 


Est GFR (CKD-EPI)AfAm   104.98  104.98


 


Est GFR (CKD-EPI)NonAf   90.58  90.58


 


Random Glucose   87  83


 


Calcium   8.1 L  8.1 L


 


Total Bilirubin   0.8 


 


AST   45 H 


 


ALT   35 


 


Alkaline Phosphatase   49 


 


B-Natriuretic Peptide   62.6 


 


Total Protein   6.9 


 


Albumin   3.8 














*DC/Admit/Observation/Transfer


Diagnosis at time of Disposition: 


 Hyponatremia








- Discharge Dispostion


Decision to Admit order: Yes





- Referrals


Referrals: 


Nusrat Hudson MD [Primary Care Provider] - 





- Patient Instructions





- Post Discharge Activity

## 2019-05-31 NOTE — HP
CHIEF COMPLAINT: headache





PCP: Nusrat Avalos





HISTORY OF PRESENT ILLNESS:


64 y/o M w/PMH of HTN, HLD, MI (2007) s/p PPM and stents comes to the ER with 

HA in back of head x2 days. HA is 8/10 in intensity, no radiation, constant, 

not relieved with tylenol and motrin, 1st time with these symptoms. He had one 

episode of vomiting yesterday. No trauma to the area, visual changes, hearing 

changes, change in meds, weight loss, smoking, cough, change in urination,CP, 

SOB, abd pain, dyusria, change in BMs, blood in stool, worsening of LE edema. 

He was recently treated for bronchitis and was tx'd with abx which he has 

finished. He feels somewhat better currently. 





ER course was notable for:


(1) Head CT, CXR, NS, ofirmev


(2)


(3)





Recent Travel: denies





PAST MEDICAL HISTORY:HTN, HLD, MI (2007) s/p PPM and stents





PAST SURGICAL HISTORY: cardiac stents





Social History:


Smoking: denies


Alcohol:on rare special occasions


Drugs: denies


Works as 





Family History: denies FH


Allergies





No Known Allergies Allergy (Verified 05/31/19 03:32)


 








HOME MEDICATIONS:


 Home Medications











 Medication  Instructions  Recorded


 


Aspirin [ASA -] 81 mg PO DAILY 06/23/18


 


Candesartan Cilexetil [Atacand -] 16 mg PO DAILY 06/23/18


 


Ezetimibe [Zetia] 10 mg PO DAILY 06/23/18


 


Metoprolol Tartrate [Lopressor -] 25 mg PO DAILY 06/23/18


 


Pitavastatin Calcium [Livalo] 4 mg PO DAILY 07/28/18


 


Dutasteride 0.5 mg PO DAILY 07/30/18


 


Furosemide 20 mg PO DAILY 07/30/18


 


Miscellaneous Drug Not In Syst 1 each  ASDIR #1 misc 07/30/18





[Outpatient Lab Test]  


 


Tamsulosin HCl 0.4 mg PO DAILY 07/30/18








REVIEW OF SYSTEMS


CONSTITUTIONAL: 


Absent:  fever, chills


HEENT: 


Absent:  hearing changes, visual changes


CARDIOVASCULAR: +LE edema (chronic, no changes)


Absent: chest pain, palpitations, lightheadedness


RESPIRATORY: 


Absent: cough, shortness of breath


GASTROINTESTINAL:+vomiting x1 episode


Absent: abdominal pain


GENITOURINARY: 


Absent: dysuria, frequency, hematuria


NEUROLOGIC: +headache


Absent: headache, dizziness











PHYSICAL EXAMINATION


 Vital Signs - 24 hr











  05/31/19 05/31/19 05/31/19





  03:32 06:15 15:32


 


Temperature 98.5 F 98.1 F 98.3 F


 


Pulse Rate 60  


 


Pulse Rate [   60





Apical]   


 


Pulse Rate [  68 





Both]   


 


Respiratory 18 17 16





Rate   


 


Blood Pressure 120/69  


 


Blood Pressure  129/65 101/60





[Left]   


 


O2 Sat by Pulse 97 98 98





Oximetry (%)   











GENERAL: Awake, alert, and fully oriented, in no acute distress.


EYES: extraocular movements intact, sclera anicteric, conjunctiva clear. 


LUNGS: Breath sounds equal, clear to auscultation bilaterally


HEART: Regular rate and rhythm, normal S1 and S2 


ABDOMEN: Soft, nontender, not distended, normoactive bowel sounds 


LOWER EXTREMITIES: warm, well-perfused.  1 to 2+ LE pitting edema


NEUROLOGICAL:  Cranial nerves II-XII intact. Normal speech. Sensation to light 

touch intact and equal on b/l face, UE, LE.


PSYCHIATRIC: Cooperative. Good eye contact. Appropriate mood and affect.


SKIN: Warm, dry





 Laboratory Results - last 24 hr











  05/31/19 05/31/19 05/31/19





  04:00 04:00 08:00


 


WBC  5.5  


 


RBC  5.10  


 


Hgb  15.3  


 


Hct  44.0  


 


MCV  86.4  


 


MCH  30.0  


 


MCHC  34.8  


 


RDW  13.6  


 


Plt Count  235  


 


MPV  6.6 L  


 


Absolute Neuts (auto)  2.6  


 


Neutrophils %  48.1  


 


Lymphocytes %  35.3  


 


Monocytes %  11.5 H  


 


Eosinophils %  4.5  


 


Basophils %  0.6  


 


Nucleated RBC %  0  


 


Sodium   120 L  121 L


 


Potassium   4.6  4.5


 


Chloride   84 L  86 L


 


Carbon Dioxide   30  29


 


Anion Gap   6 L  6 L


 


BUN   8  7


 


Creatinine   0.9  0.9


 


Est GFR (CKD-EPI)AfAm   104.98  104.98


 


Est GFR (CKD-EPI)NonAf   90.58  90.58


 


Random Glucose   87  83


 


Serum Osmolality   


 


Calcium   8.1 L  8.1 L


 


Total Bilirubin   0.8 


 


AST   45 H 


 


ALT   35 


 


Alkaline Phosphatase   49 


 


B-Natriuretic Peptide   62.6 


 


Total Protein   6.9 


 


Albumin   3.8 


 


Urine Color   


 


Urine Appearance   


 


Urine pH   


 


Ur Specific Gravity   


 


Urine Protein   


 


Urine Glucose (UA)   


 


Urine Ketones   


 


Urine Blood   


 


Urine Nitrite   


 


Urine Bilirubin   


 


Urine Urobilinogen   


 


Ur Leukocyte Esterase   


 


Urine Osmolality   


 


Ur Random Sodium   


 


Ur Random Potassium   


 


Ur Random Chloride   


 


Ur Sodium 24 Hour   














  05/31/19 05/31/19 05/31/19





  10:39 10:40 10:40


 


WBC   


 


RBC   


 


Hgb   


 


Hct   


 


MCV   


 


MCH   


 


MCHC   


 


RDW   


 


Plt Count   


 


MPV   


 


Absolute Neuts (auto)   


 


Neutrophils %   


 


Lymphocytes %   


 


Monocytes %   


 


Eosinophils %   


 


Basophils %   


 


Nucleated RBC %   


 


Sodium   


 


Potassium   


 


Chloride   


 


Carbon Dioxide   


 


Anion Gap   


 


BUN   


 


Creatinine   


 


Est GFR (CKD-EPI)AfAm   


 


Est GFR (CKD-EPI)NonAf   


 


Random Glucose   


 


Serum Osmolality  248 L  


 


Calcium   


 


Total Bilirubin   


 


AST   


 


ALT   


 


Alkaline Phosphatase   


 


B-Natriuretic Peptide   


 


Total Protein   


 


Albumin   


 


Urine Color   


 


Urine Appearance   


 


Urine pH   


 


Ur Specific Gravity   


 


Urine Protein   


 


Urine Glucose (UA)   


 


Urine Ketones   


 


Urine Blood   


 


Urine Nitrite   


 


Urine Bilirubin   


 


Urine Urobilinogen   


 


Ur Leukocyte Esterase   


 


Urine Osmolality   346  D 


 


Ur Random Sodium   Cancelled  96


 


Ur Random Potassium    28.8


 


Ur Random Chloride    108 L


 


Ur Sodium 24 Hour   Cancelled 














  05/31/19





  10:40


 


WBC 


 


RBC 


 


Hgb 


 


Hct 


 


MCV 


 


MCH 


 


MCHC 


 


RDW 


 


Plt Count 


 


MPV 


 


Absolute Neuts (auto) 


 


Neutrophils % 


 


Lymphocytes % 


 


Monocytes % 


 


Eosinophils % 


 


Basophils % 


 


Nucleated RBC % 


 


Sodium 


 


Potassium 


 


Chloride 


 


Carbon Dioxide 


 


Anion Gap 


 


BUN 


 


Creatinine 


 


Est GFR (CKD-EPI)AfAm 


 


Est GFR (CKD-EPI)NonAf 


 


Random Glucose 


 


Serum Osmolality 


 


Calcium 


 


Total Bilirubin 


 


AST 


 


ALT 


 


Alkaline Phosphatase 


 


B-Natriuretic Peptide 


 


Total Protein 


 


Albumin 


 


Urine Color  Yellow


 


Urine Appearance  Clear


 


Urine pH  7.5


 


Ur Specific Gravity  1.010


 


Urine Protein  Negative


 


Urine Glucose (UA)  Negative


 


Urine Ketones  Negative


 


Urine Blood  Negative


 


Urine Nitrite  Negative


 


Urine Bilirubin  Negative


 


Urine Urobilinogen  0.2


 


Ur Leukocyte Esterase  Negative


 


Urine Osmolality 


 


Ur Random Sodium 


 


Ur Random Potassium 


 


Ur Random Chloride 


 


Ur Sodium 24 Hour 











ASSESSMENT/PLAN:


64 y/o M w/PMH of HTN, HLD, MI (2007) s/p PPM and stents comes to the ER with 

HA in back of head x2 days and 1 episode of vomiting. Admitted for severe 

hyponatremia.





-Severe hyponatremia


   -Monitor BMP


   -Do not increase Na more than 10 in 24 hours


   -On NS @ 75 ml/hr


   -UA neg, Elsie 96, UOsm 346


   -hold lasix, fluid restriction to 1L free water per day


   -Head CT neg


   -Echo 5/31/19


      Interpretation Summary


      Mild inferolateral hypokinesis.


      Left ventricular systolic function is normal.


      Ejection Fraction = 50-55%.


      The right ventricle is normal in size and function.


      There is moderate aortic sclerosis.;


      There is no pericardial effusion.


   -Tylenol for pain





-HTN


   -c/w candesartan





-LE edema


   -Hold lasix





-BPH


   -c/w dutasteride, flomax





-HLD


   -c/w statin





-DVT ppx


   -heparin 5000 units sq q8h





-FEN


   -NS @ 75ml/hr


   -Hyponatremia - monitor


   -Cardiac diet





-Dispo: Monitor on med/tele





Visit type





- Emergency Visit


Emergency Visit: Yes


ED Registration Date: 05/31/19


Care time: The patient presented to the Emergency Department on the above date 

and was hospitalized for further evaluation of their emergent condition.





- New Patient


This patient is new to me today: Yes


Date on this admission: 05/31/19





- Critical Care


Critical Care patient: No

## 2019-05-31 NOTE — CONSULT
Consult


Consult Specialty:: Nephrology


Reason for Consultation:: hyponatremia





- History of Present Illness


Chief Complaint: headache


History of Present Illness: 





Pt is a 63 year old male with pmhx of HTN, HLD, CAD, MI, and cardiac stents who 

presents to the ER with headache. He denies shortness of breath. He did have an 

episode of nausea and vomiting. He says that he has had a cough and thinks he 

may have had brnchitis. He says he tries to drink water to keep himself 

hydrated. He is on lasix 20 mg every other day. He denies heart failure. He 

denies change in vision. He denies weight loss. He denies history of 

hyponatremia however he has been hyponatermic on his previous admissions. 





- History Source


History Provided By: Patient, Medical Record





- Past Medical History


Cardio/Vascular: Yes: CAD, HTN, Hyperlipdemia





- Past Surgical History


Past Surgical History: Yes: Stent





- Alcohol/Substance Use


Hx Alcohol Use: No





- Smoking History


Smoking history: Never smoked


Have you smoked in the past 12 months: No





- Social History


History of Recent Travel: Yes





Home Medications





- Allergies


Allergies/Adverse Reactions: 


 Allergies











Allergy/AdvReac Type Severity Reaction Status Date / Time


 


No Known Allergies Allergy   Verified 05/31/19 03:32














- Home Medications


Home Medications: 


Ambulatory Orders





Aspirin [ASA -] 81 mg PO DAILY 06/23/18 


Candesartan Cilexetil [Atacand -] 16 mg PO DAILY 06/23/18 


Ezetimibe [Zetia] 10 mg PO DAILY 06/23/18 


Metoprolol Tartrate [Lopressor -] 25 mg PO DAILY 06/23/18 


Pitavastatin Calcium [Livalo] 4 mg PO DAILY 07/28/18 


Dutasteride 0.5 mg PO DAILY 07/30/18 


Furosemide 20 mg PO DAILY 07/30/18 


Miscellaneous Drug Not In Syst [Outpatient Lab Test] 1 each  ASDIR #1 misc 07/ 30/18 


Tamsulosin HCl 0.4 mg PO DAILY 07/30/18 











Family Disease History





- Family Disease History


Family History: Denies





Review of Systems





- Review of Systems


Constitutional: reports: Malaise


Eyes: reports: No Symptoms


HENT: reports: No Symptoms


Neck: reports: No Symptoms


Cardiovascular: reports: No Symptoms


Respiratory: reports: No Symptoms


Gastrointestinal: reports: Vomiting


Genitourinary: reports: No Symptoms


Musculoskeletal: reports: No Symptoms


Integumentary: reports: No Symptoms


Neurological: reports: No Symptoms


Endocrine: reports: No Symptoms


Hematology/Lymphatic: reports: No Symptoms


Psychiatric: reports: No Symptoms





Physical Exam


Vital Signs: 


 Vital Signs











Temperature  98.1 F   05/31/19 06:15


 


Pulse Rate  68   05/31/19 06:15


 


Respiratory Rate  17   05/31/19 06:15


 


Blood Pressure  129/65   05/31/19 06:15


 


O2 Sat by Pulse Oximetry (%)  98   05/31/19 06:15











Constitutional: Yes: Calm


Eyes: Yes: Conjunctiva Clear


HENT: Yes: Atraumatic


Neck: Yes: Supple


Cardiovascular: Yes: S1, S2


Respiratory: Yes: CTA Bilaterally


Gastrointestinal: Yes: Normal Bowel Sounds, Soft


Renal/: Yes: WNL


Musculoskeletal: Yes: WNL


Edema: Yes


Edema: LLE: Trace, RLE: Trace


Neurological: Yes: Oriented


Psychiatric: Yes: Oriented


Labs: 


 CBC, BMP





 05/31/19 04:00 





 05/31/19 08:00 





 Laboratory Tests











  07/28/18 07/29/18 07/30/18





  11:26 06:00 08:00


 


WBC   


 


Hgb   


 


Plt Count   


 


Sodium  126 L  130 L  132 L


 


Serum Osmolality   


 


Ur Specific Gravity   


 


Urine Protein   


 


Urine Blood   


 


Urine Osmolality   


 


Ur Random Sodium   














  05/31/19 05/31/19 05/31/19





  04:00 04:00 08:00


 


WBC  5.5  


 


Hgb  15.3  


 


Plt Count  235  


 


Sodium   120 L  121 L


 


Serum Osmolality   


 


Ur Specific Gravity   


 


Urine Protein   


 


Urine Blood   


 


Urine Osmolality   


 


Ur Random Sodium   














  05/31/19 05/31/19 05/31/19





  10:39 10:40 10:40


 


WBC   


 


Hgb   


 


Plt Count   


 


Sodium   


 


Serum Osmolality  248 L  


 


Ur Specific Gravity   


 


Urine Protein   


 


Urine Blood   


 


Urine Osmolality   346  D 


 


Ur Random Sodium    96














  05/31/19





  10:40


 


WBC 


 


Hgb 


 


Plt Count 


 


Sodium 


 


Serum Osmolality 


 


Ur Specific Gravity  1.010


 


Urine Protein  Negative


 


Urine Blood  Negative


 


Urine Osmolality 


 


Ur Random Sodium 














Imaging





- Results


Chest X-ray: Report Reviewed





Problem List





- Problems


(1) Vomiting


Code(s): R11.10 - VOMITING, UNSPECIFIED   





(2) Hyponatremia


Code(s): E87.1 - HYPO-OSMOLALITY AND HYPONATREMIA   





(3) CAD (coronary atherosclerotic disease)


Code(s): I25.10 - ATHSCL HEART DISEASE OF NATIVE CORONARY ARTERY W/O ANG PCTRS 

  


Qualifiers: 


   Coronary Disease-Associated Artery/Lesion type: unspecified vessel or lesion 

type   Native vs. transplanted heart: unspecified whether native or 

transplanted heart   Associated angina: without angina   Qualified Code(s): 

I25.10 - Atherosclerotic heart disease of native coronary artery without angina 

pectoris   





(4) HLD (hyperlipidemia)


Code(s): E78.5 - HYPERLIPIDEMIA, UNSPECIFIED   


Qualifiers: 


   Hyperlipidemia type: unspecified   Qualified Code(s): E78.5 - Hyperlipidemia

, unspecified   





(5) HTN (hypertension)


Code(s): I10 - ESSENTIAL (PRIMARY) HYPERTENSION   


Qualifiers: 


   Hypertension type: essential hypertension   Qualified Code(s): I10 - 

Essential (primary) hypertension   





Assessment/Plan





Impression


1. hyponatremia


2. vomiting


3. CAD


4. HTN


5. HLD





Plan


- pt did show a small response to saline


- will restart saline


- hold lasix


- restrict free water


- urine sodium is elevated however this was after a liter


of saline


- monitor sodium levels


- discussed with medical team


- medical team to follow evening labs


- check echo


- pt at risk to fall


Dr Fair

## 2019-05-31 NOTE — ECHO
______________________________________________________________________________



Name: RODRÍGUEZ BRUSH                               Exam:Adult Echocardiogram

MRN: [id number]            Study Date: 2019 02:25 PM

Age: 63 yrs

______________________________________________________________________________



Reason For Study: CHF

Height: 63 in        Weight: 221 lb        BSA: 2.0 m2



______________________________________________________________________________



MMode/2D Measurements & Calculations

IVSd: 0.89 cm                                           Ao root diam: 2.9 cm

LVIDd: 5.7 cm                                           LA dimension: 3.7 cm

LVIDs: 3.6 cm

LVPWd: 1.0 cm



_________________________________________________________

LVPWs: 1.9 cm                                           EDV(Teich): 157.8 ml

                                                        ESV(Teich): 53.9 ml



_________________________________________________________

RV S Samy: 12.4 cm/sec



Doppler Measurements & Calculations

MV E max samy: 61.6 cm/sec                                  Ao V2 max: 88.7 cm/sec

MV A max samy: 91.2 cm/sec                                  Ao max P.6 mmHg

MV E/A: 0.68                                               Ao V2 mean: 87.8 cm/sec

MV dec time: 0.24 sec                                      Ao mean PG: 3.7 mmHg

                                                           Ao V2 VTI: 26.8 cm



____________________________________________________________

LV V1 max PG: 3.5 mmHg                                     PA V2 max: 100.4 cm/sec

LV V1 mean P.5 mmHg                                    PA max P.0 mmHg

LV V1 max: 94.1 cm/sec

LV V1 mean: 54.5 cm/sec

LV V1 VTI: 18.0 cm



____________________________________________________________

Med Peak E' Samy: 5.3 cm/sec

Med E/e': 11.6

Lat Peak E' Samy: 7.4 cm/sec

Lat E/e': 8.3





______________________________________________________________________________

Left Ventricle

Left ventricular systolic function is normal. Ejection Fraction = 50-55%. Mild inferolateral hypokine
sis.

Right Ventricle

The right ventricle is normal in size and function.

Atria

Normal left and right atrial size and function.

Mitral Valve

The mitral valve is normal in structure and function. There is no mitral valve stenosis.

Tricuspid Valve

The tricuspid valve is not well visualized, but is grossly normal. No tricuspid regurgitation.

Aortic Valve

There is moderate aortic sclerosis.;. No hemodynamically significant valvular aortic stenosis. No aor
tic

regurgitation is present.

Pulmonic Valve

The pulmonic valve is not well seen, but is grossly normal. There is no pulmonic valvular stenosis.

Great Vessels

The aortic root is normal size.

Pericardium/Pleura

There is no pericardial effusion.

______________________________________________________________________________





Interpretation Summary

Mild inferolateral hypokinesis.

Left ventricular systolic function is normal.

Ejection Fraction = 50-55%.

The right ventricle is normal in size and function.

There is moderate aortic sclerosis.;

There is no pericardial effusion.





MD Abreu *Verna 2019 04:18 PM

## 2019-05-31 NOTE — PN
Teaching Attending Note


Name of Resident: Milo Pierce





ATTENDING PHYSICIAN STATEMENT





I saw and evaluated the patient.


I reviewed the resident's note and discussed the case with the resident.


I agree with the resident's findings and plan as documented.








CC: I have a headache





HPI: Mr Price is a 63 year old male who comes in with a headache. He 

was in his normal state of health when he developed a headache 2 days ago. He 

did not sustain trauma to the head. He did not pass out with this. It is 

located in the back of his head and does not radiate. He does not have neck 

stiffness associated with it. He took motrin and tylenol but it did not 

improve. Because the headache is persistent, he decided to come in. He also 

notes that he had an episode of nausea and vomiting yesterday but attributes 

that to antibiotics he is taking. He was recently diagnosed with a bronchitis 

so received a short course of antibiotics. He does not know what it is. He 

denies lightheadedness, dizziness, passing out, fevers, chills, photophobia, 

phonophobia, loss or change of vision, tinnitus, confusion, difficulty word 

finding, facial droop, chest pain, current nausea or vomiting, diarrhea, 

constipation, difficulty or pain on urination. He has chronic lower extremity 

edema that is unchanged.





 Past Medical History





Cardio/Vascular                  CAD,HTN,Hyperlipdemia





 Past Surgical History





Past Surgical History            Stent, pacemaker, TURP





 Home Medications











 Medication  Instructions  Recorded


 


Aspirin [ASA -] 81 mg PO DAILY 06/23/18


 


Candesartan Cilexetil [Atacand -] 16 mg PO DAILY 06/23/18


 


Ezetimibe [Zetia] 10 mg PO DAILY 06/23/18


 


Metoprolol Tartrate [Lopressor -] 25 mg PO DAILY 06/23/18


 


Pitavastatin Calcium [Livalo] 4 mg PO DAILY 07/28/18


 


Dutasteride 0.5 mg PO DAILY 07/30/18


 


Furosemide 20 mg PO DAILY 07/30/18


 


Miscellaneous Drug Not In Syst 1 each  ASDIR #1 misc 07/30/18





[Outpatient Lab Test]  


 


Tamsulosin HCl 0.4 mg PO DAILY 07/30/18











 Social History





Smoking history                  Never smoked


Have you smoked in the past 12   No


months                           


Hx Alcohol Use                   No





Family History: says all family members are healthy





ROS: full review of systems obtained, as per HPI and otherwise negative








OBJECTIVE:


 Last Vital Signs











Temp Pulse Resp BP Pulse Ox


 


 36.8 C   60   16   101/60   98 


 


 05/31/19 15:32  05/31/19 15:32  05/31/19 15:32  05/31/19 15:32  05/31/19 15:32








Gen: nad


HEENT: perrla, eomi, moist mucous membranes


Pulm: ctab w/o w/r/r


CV: rrr w/o m/r/g


Abd: +bs, s/nt/nd


Ext: trace BLE edema





 CBC, BMP





 05/31/19 04:00 





 05/31/19 08:00 











ASSESSMENT AND PLAN:








Problem List





- Problems


(1) Hyponatremia


Assessment/Plan: 


-severe hyponatremia with symptoms


-case d/w Dr Fair


-will need telemetry monitoring for at least 24 hours


-NS


-hold lasix


-monitor sodium levels


-restrict free water


Code(s): E87.1 - HYPO-OSMOLALITY AND HYPONATREMIA   





(2) Vomiting


Assessment/Plan: 


-suspect secondary to hyponatremia


-monitor


Code(s): R11.10 - VOMITING, UNSPECIFIED   





(3) CAD (coronary atherosclerotic disease)


Assessment/Plan: 


-quiescent


-continue aspirin, arb, metoprolol, statin, and zetia


Code(s): I25.10 - ATHSCL HEART DISEASE OF NATIVE CORONARY ARTERY W/O ANG PCTRS 

  


Qualifiers: 


   Coronary Disease-Associated Artery/Lesion type: unspecified vessel or lesion 

type   Native vs. transplanted heart: unspecified whether native or 

transplanted heart   Associated angina: without angina   Qualified Code(s): 

I25.10 - Atherosclerotic heart disease of native coronary artery without angina 

pectoris   





(4) HLD (hyperlipidemia)


Assessment/Plan: 


-continue statin and zetia


Code(s): E78.5 - HYPERLIPIDEMIA, UNSPECIFIED   


Qualifiers: 


   Hyperlipidemia type: unspecified   Qualified Code(s): E78.5 - Hyperlipidemia

, unspecified   





(5) HTN (hypertension)


Assessment/Plan: 


-controlled


Code(s): I10 - ESSENTIAL (PRIMARY) HYPERTENSION   


Qualifiers: 


   Hypertension type: essential hypertension   Qualified Code(s): I10 - 

Essential (primary) hypertension

## 2019-05-31 NOTE — PDOC
History of Present Illness





- General


Chief Complaint: Headache


Stated Complaint: HEADACHE


Time Seen by Provider: 05/31/19 03:36


History Source: Patient


Exam Limitations: No Limitations





- History of Present Illness


Initial Comments: 





05/31/19 04:46


63M with a PMH of hypertension, hyperlipidemia, CAD, MI(2007), cardiac stents, 

pacemaker who presents to the ER with complaints of a headache. The patient 

states that he had a headache which began 2 days ago. The headache is located 

in his occiput, nonradiating, constant with worsening intensity at night, 

associated with 1 bout of nausea and vomiting yesterday morning, without 

exacerbating or alleviating factors. He admits to "fighting a bronchitis" for 

the last week, for which he is taking abx. He denies changes in vision, numbness

, tingling, weakness, neck stiffness, CP, SOB.








Past History





- Past Medical History


Allergies/Adverse Reactions: 


 Allergies











Allergy/AdvReac Type Severity Reaction Status Date / Time


 


No Known Allergies Allergy   Verified 05/31/19 03:32











Home Medications: 


Ambulatory Orders





Aspirin [ASA -] 81 mg PO DAILY 06/23/18 


Candesartan Cilexetil [Atacand -] 16 mg PO DAILY 06/23/18 


Ezetimibe [Zetia] 10 mg PO DAILY 06/23/18 


Metoprolol Tartrate [Lopressor -] 25 mg PO DAILY 06/23/18 


Pitavastatin Calcium [Livalo] 4 mg PO DAILY 07/28/18 


Dutasteride 0.5 mg PO DAILY 07/30/18 


Furosemide 20 mg PO DAILY 07/30/18 


Miscellaneous Drug Not In Syst [Outpatient Lab Test] 1 each  ASDIR #1 misc 07/ 30/18 


Tamsulosin HCl 0.4 mg PO DAILY 07/30/18 








Cardiac Disorders: Yes


CVA: No


COPD: No


DVT: No


Dementia: No


HTN: Yes


Hypercholesterolemia: Yes





- Surgical History


Cardiac Surgery: Yes (PACEMAKER 2007)





- Immunization History


Immunization Up to Date: Yes





- Suicide/Smoking/Psychosocial Hx


Smoking History: Never smoked


Have you smoked in the past 12 months: No


Information on smoking cessation initiated: No


Hx Alcohol Use: No


Drug/Substance Use Hx: No


Substance Use Type: None





**Review of Systems





- Review of Systems


Able to Perform ROS?: Yes


Comments:: 





05/31/19 05:06


GENERAL/CONSTITUTIONAL: No fever or chills. No weakness.


HEAD, EYES, EARS, NOSE AND THROAT: No change in vision. No ear pain or 

discharge. No sore throat.


CARDIOVASCULAR: No chest pain, palpitations, or lightheadedness.


RESPIRATORY: No cough, wheezing, shortness of breath, or hemoptysis.


GASTROINTESTINAL: + for resolved nausea and vomiting. No abdominal pain, 

diarrhea, or constipation. 


GENITOURINARY: No dysuria, frequency, hematuria, or change in urination.


MUSCULOSKELETAL: No joint or muscle swelling or pain. No neck or back pain.


SKIN: No rash or lesions. 


NEUROLOGIC: + for headache. No numbness, tingling, focal weakness, loss of 

consciousness, or change in strength/sensation.





Is the patient limited English proficient: No





*Physical Exam





- Vital Signs


 Last Vital Signs











Temp Pulse Resp BP Pulse Ox


 


 98.5 F   60   18   120/69   97 


 


 05/31/19 03:32  05/31/19 03:32  05/31/19 03:32  05/31/19 03:32  05/31/19 03:32














- Physical Exam


Comments: 





05/31/19 05:07


GENERAL: Well developed, well nourished. Awake and alert. No acute distress.


HEENT: Normocephalic, atraumatic. Hearing grossly normal. Moist mucous 

membranes. PERRLA, EOMI. No conjunctival pallor. Sclera are non-icteric. 

Oropharynx is clear.


NECK: Supple. Full ROM. No JVD. 


CARDIOVASCULAR: Regular rate and rhythm. No murmurs, rubs, or gallops. 


PULMONARY: No evidence of respiratory distress. Lungs clear to auscultation 

bilaterally. No wheezing, rales or rhonchi.


ABDOMINAL: Soft. Non-tender. Non-distended. No rebound or guarding. 


GENITOURINARY: No CVA tenderness bilaterally.


MUSCULOSKELETAL: Normal range of motion at all joints. No bony deformities or 

tenderness. 


EXTREMITIES: No cyanosis. No clubbing. No edema. No calf tenderness or swelling.


SKIN: Warm and dry. Normal capillary refill. No rashes. No jaundice. 


NEUROLOGICAL: Alert, awake, appropriate. Cranial nerves 2-12 intact. No 

deficits to light touch and temperature in face, upper extremities and lower 

extremities. 5/5 strength in deltoids, biceps, triceps, quadriceps, hamstrings, 

and gastrocnemius. Normal speech. Gait is normal without ataxia.


PSYCHIATRIC: Cooperative. Good eye contact. Appropriate mood and affect.








ED Treatment Course





- LABORATORY


CBC & Chemistry Diagram: 


 05/31/19 04:00





 05/31/19 04:00





- ADDITIONAL ORDERS


Additional order review: 


 











  05/31/19





  04:00


 


RBC  5.10


 


MCV  86.4


 


MCHC  34.8


 


RDW  13.6


 


MPV  6.6 L


 


Neutrophils %  48.1


 


Lymphocytes %  35.3


 


Monocytes %  11.5 H


 


Eosinophils %  4.5


 


Basophils %  0.6














- RADIOLOGY


Radiology Studies Ordered: 














 Category Date Time Status


 


 HEAD CT WITHOUT CONTRAST [CT] Stat CT Scan  05/31/19 03:44 Ordered














- Medications


Given in the ED: 


ED Medications














Discontinued Medications














Generic Name Dose Route Start Last Admin





  Trade Name Freq  PRN Reason Stop Dose Admin


 


Acetaminophen  1,000 mg  05/31/19 03:45  05/31/19 04:16





  Ofirmev Injection -  IVPB  05/31/19 03:46  1,000 mg





  ONCE ONE   Administration





     





     





     





     














Medical Decision Making





- Medical Decision Making





05/31/19 05:07


63M with multiple cardiac problems who presents with 2 days of worsening 

headaches concerning for ICH, IC mass, worsening sinusitis. Pending labs and 

imaging. Will give IV tylenol and reglan after EKG done to evaluate for QTc.





05/31/19 06:23


EKG unremarkable. Na notable at 120. Will give 1 L NS and repeat BMP. 





05/31/19 07:27


Pt signed out to Dr. Vance for further evaluation. Pending CT and repeat 

BMP.





*DC/Admit/Observation/Transfer





- Referrals


Referrals: 


Nusrat Hudson MD [Primary Care Provider] - 





- Patient Instructions





- Post Discharge Activity

## 2019-06-01 LAB
ANION GAP SERPL CALC-SCNC: 7 MMOL/L (ref 8–16)
BUN SERPL-MCNC: 9 MG/DL (ref 7–18)
CALCIUM SERPL-MCNC: 8.4 MG/DL (ref 8.5–10.1)
CHLORIDE SERPL-SCNC: 89 MMOL/L (ref 98–107)
CO2 SERPL-SCNC: 28 MMOL/L (ref 21–32)
CREAT SERPL-MCNC: 0.9 MG/DL (ref 0.55–1.3)
DEPRECATED RDW RBC AUTO: 13.7 % (ref 11.9–15.9)
GLUCOSE SERPL-MCNC: 81 MG/DL (ref 74–106)
HCT VFR BLD CALC: 44.5 % (ref 35.4–49)
HGB BLD-MCNC: 15.6 GM/DL (ref 11.7–16.9)
MAGNESIUM SERPL-MCNC: 2.4 MG/DL (ref 1.8–2.4)
MCH RBC QN AUTO: 30.4 PG (ref 25.7–33.7)
MCHC RBC AUTO-ENTMCNC: 35.2 G/DL (ref 32–35.9)
MCV RBC: 86.5 FL (ref 80–96)
PHOSPHATE SERPL-MCNC: 3 MG/DL (ref 2.5–4.9)
PLATELET # BLD AUTO: 237 K/MM3 (ref 134–434)
PMV BLD: 7.2 FL (ref 7.5–11.1)
POTASSIUM SERPLBLD-SCNC: 5.1 MMOL/L (ref 3.5–5.1)
RBC # BLD AUTO: 5.15 M/MM3 (ref 4–5.6)
SODIUM SERPL-SCNC: 124 MMOL/L (ref 136–145)
WBC # BLD AUTO: 4.8 K/MM3 (ref 4–10)

## 2019-06-01 RX ADMIN — ATORVASTATIN CALCIUM SCH MG: 20 TABLET, FILM COATED ORAL at 21:46

## 2019-06-01 RX ADMIN — HEPARIN SODIUM SCH UNIT: 5000 INJECTION, SOLUTION INTRAVENOUS; SUBCUTANEOUS at 11:31

## 2019-06-01 RX ADMIN — HEPARIN SODIUM SCH UNIT: 5000 INJECTION, SOLUTION INTRAVENOUS; SUBCUTANEOUS at 18:12

## 2019-06-01 RX ADMIN — SODIUM CHLORIDE TAB 1 GM SCH GM: 1 TAB at 18:12

## 2019-06-01 RX ADMIN — DUTASTERIDE SCH MG: 0.5 CAPSULE, LIQUID FILLED ORAL at 15:06

## 2019-06-01 RX ADMIN — SODIUM CHLORIDE SCH MLS/HR: 9 INJECTION, SOLUTION INTRAVENOUS at 02:56

## 2019-06-01 RX ADMIN — SODIUM CHLORIDE TAB 1 GM SCH GM: 1 TAB at 21:46

## 2019-06-01 RX ADMIN — EZETIMIBE SCH MG: 10 TABLET ORAL at 11:31

## 2019-06-01 RX ADMIN — SODIUM CHLORIDE SCH MLS/HR: 9 INJECTION, SOLUTION INTRAVENOUS at 18:13

## 2019-06-01 RX ADMIN — TAMSULOSIN HYDROCHLORIDE SCH MG: 0.4 CAPSULE ORAL at 11:30

## 2019-06-01 RX ADMIN — ATORVASTATIN CALCIUM SCH MG: 20 TABLET, FILM COATED ORAL at 00:21

## 2019-06-01 RX ADMIN — METOPROLOL TARTRATE SCH MG: 25 TABLET, FILM COATED ORAL at 11:32

## 2019-06-01 RX ADMIN — HEPARIN SODIUM SCH UNIT: 5000 INJECTION, SOLUTION INTRAVENOUS; SUBCUTANEOUS at 02:55

## 2019-06-01 RX ADMIN — VALSARTAN SCH MG: 160 TABLET, FILM COATED ORAL at 11:32

## 2019-06-01 RX ADMIN — SODIUM CHLORIDE SCH MLS/HR: 9 INJECTION, SOLUTION INTRAVENOUS at 06:38

## 2019-06-01 RX ADMIN — ASPIRIN 81 MG SCH MG: 81 TABLET ORAL at 11:30

## 2019-06-01 NOTE — PN
Progress Note, Physician


Chief Complaint: 


Mr Price is without complaint today. Denies cp, sob, n/v.





- Current Medication List


Current Medications: 


Active Medications





Acetaminophen (Tylenol -)  650 mg PO Q6H PRN


   PRN Reason: PAIN


Aspirin (Asa -)  81 mg PO DAILY [facility]


Atorvastatin Calcium (Lipitor -)  20 mg PO HS [facility]


   Last Admin: 06/01/19 00:21 Dose:  20 mg


Dutasteride (Avodart -)  0.5 mg PO DAILY [facility]


Ezetimibe (Zetia -)  10 mg PO DAILY [facility]


Heparin Sodium (Porcine) (Heparin -)  5,000 unit SQ Q8H-IV FANY


   Last Admin: 06/01/19 02:55 Dose:  5,000 unit


Sodium Chloride (Normal Saline -)  1,000 mls @ 75 mls/hr IV ASDIR [facility]


   Last Admin: 06/01/19 06:38 Dose:  75 mls/hr


Metoprolol Tartrate (Lopressor -)  25 mg PO DAILY [facility]


Tamsulosin HCl (Flomax -)  0.4 mg PO DAILY@0830 [facility]


Valsartan (Diovan -)  160 mg PO DAILY [facility]











- Objective


Vital Signs: 


 Vital Signs











Temperature  36.6 C   06/01/19 06:00


 


Pulse Rate  61   06/01/19 06:00


 


Respiratory Rate  18   06/01/19 06:00


 


Blood Pressure  121/66   06/01/19 06:00


 


O2 Sat by Pulse Oximetry (%)  94 L  06/01/19 01:11











Constitutional: Yes: Well Nourished, No Distress, Calm


Cardiovascular: Yes: Regular Rate and Rhythm.  No: Gallop, Murmur, Rub


Respiratory: Yes: Regular, CTA Bilaterally.  No: Rales, Rhonchi, Wheezes


Gastrointestinal: Yes: Normal Bowel Sounds, Soft.  No: Distention, Tenderness


Extremities: Yes: WNL


Edema: No


Labs: 


 CBC, BMP





 06/01/19 06:00 





 06/01/19 06:00 











Problem List





- Problems


(1) Hyponatremia


Code(s): E87.1 - HYPO-OSMOLALITY AND HYPONATREMIA   





(2) Vomiting


Code(s): R11.10 - VOMITING, UNSPECIFIED   





(3) CAD (coronary atherosclerotic disease)


Code(s): I25.10 - ATHSCL HEART DISEASE OF NATIVE CORONARY ARTERY W/O ANG PCTRS 

  


Qualifiers: 


   Coronary Disease-Associated Artery/Lesion type: unspecified vessel or lesion 

type   Native vs. transplanted heart: unspecified whether native or 

transplanted heart   Associated angina: without angina   Qualified Code(s): 

I25.10 - Atherosclerotic heart disease of native coronary artery without angina 

pectoris   





(4) HLD (hyperlipidemia)


Code(s): E78.5 - HYPERLIPIDEMIA, UNSPECIFIED   


Qualifiers: 


   Hyperlipidemia type: unspecified   Qualified Code(s): E78.5 - Hyperlipidemia

, unspecified   





(5) HTN (hypertension)


Code(s): I10 - ESSENTIAL (PRIMARY) HYPERTENSION   


Qualifiers: 


   Hypertension type: essential hypertension   Qualified Code(s): I10 - 

Essential (primary) hypertension   





Assessment/Plan





(1) Hyponatremia


Assessment/Plan: 


-severe hyponatremia with symptoms


-sodium 124 today, unchanged past 12 hours


-follow up nephrology recommendations


-continue NS currently


-continue to hold lasix and restrict free water


-plan to transfer to med/surg unless requires hypertonic saline 


Code(s): E87.1 - HYPO-OSMOLALITY AND HYPONATREMIA   





(2) Vomiting


Assessment/Plan: 


-resolved


-tolerating diet


Code(s): R11.10 - VOMITING, UNSPECIFIED   





(3) CAD (coronary atherosclerotic disease)


Assessment/Plan: 


-quiescent


-continue aspirin, arb, metoprolol, statin, and zetia


Code(s): I25.10 - ATHSCL HEART DISEASE OF NATIVE CORONARY ARTERY W/O ANG PCTRS 

  


Qualifiers: 


   Coronary Disease-Associated Artery/Lesion type: unspecified vessel or lesion 

type   Native vs. transplanted heart: unspecified whether native or 

transplanted heart   Associated angina: without angina   Qualified Code(s): 

I25.10 - Atherosclerotic heart disease of native coronary artery without angina 

pectoris   





(4) HLD (hyperlipidemia)


Assessment/Plan: 


-continue statin and zetia


Code(s): E78.5 - HYPERLIPIDEMIA, UNSPECIFIED   


Qualifiers: 


   Hyperlipidemia type: unspecified   Qualified Code(s): E78.5 - Hyperlipidemia

, unspecified   





(5) HTN (hypertension)


Assessment/Plan: 


-controlled


Code(s): I10 - ESSENTIAL (PRIMARY) HYPERTENSION   


Qualifiers: 


   Hypertension type: essential hypertension   Qualified Code(s): I10 - 

Essential (primary) hypertension

## 2019-06-01 NOTE — PN
Progress Note (short form)





- Note


Progress Note: 





RENAL


pt is awake and alert


comfortable


eating


 Last Vital Signs











Temp Pulse Resp BP Pulse Ox


 


 97.6 F   62   18   115/59 L  96 


 


 06/01/19 10:00  06/01/19 10:00  06/01/19 10:00  06/01/19 10:00  06/01/19 09:00








lungs clear 


cvs s1s2 rr


abd soft 


ext no edema


neuro a+ox3





 CBC, BMP





 06/01/19 06:00 





 06/01/19 06:00 





 Current Medications











Generic Name Dose Route Start Last Admin





  Trade Name Freq  PRN Reason Stop Dose Admin


 


Acetaminophen  650 mg  05/31/19 18:01  





  Tylenol -  PO   





  Q6H PRN   





  PAIN   





     





     





     


 


Aspirin  81 mg  06/01/19 10:00  06/01/19 11:30





  Asa -  PO   81 mg





  DAILY FANY   Administration





     





     





     





     


 


Atorvastatin Calcium  20 mg  05/31/19 22:00  06/01/19 00:21





  Lipitor -  PO   20 mg





  HS FANY   Administration





     





     





     





     


 


Dutasteride  0.5 mg  06/01/19 10:00  





  Avodart -  PO   





  DAILY FANY   





     





     





     





     


 


Ezetimibe  10 mg  06/01/19 10:00  06/01/19 11:31





  Zetia -  PO   10 mg





  DAILY FANY   Administration





     





     





     





     


 


Heparin Sodium (Porcine)  5,000 unit  06/01/19 02:00  06/01/19 11:31





  Heparin -  SQ   5,000 unit





  Q8H-IV FANY   Administration





     





     





     





     


 


Sodium Chloride  1,000 mls @ 75 mls/hr  05/31/19 13:45  06/01/19 06:38





  Normal Saline -  IV   75 mls/hr





  ASDIR FANY   Administration





     





     





     





     


 


Metoprolol Tartrate  25 mg  06/01/19 10:00  06/01/19 11:32





  Lopressor -  PO   25 mg





  DAILY FANY   Administration





     





     





     





     


 


Tamsulosin HCl  0.4 mg  06/01/19 08:30  06/01/19 11:30





  Flomax -  PO   0.4 mg





  DAILY@0830 FANY   Administration





     





     





     





     


 


Valsartan  160 mg  06/01/19 10:00  06/01/19 11:32





  Diovan -  PO   160 mg





  DAILY FANY   Administration





     





     





     





     








IMPRESSION


hypoosmolar hyponatremia slightly improved with ivf


obesity


hypocalcemia





PLAN


continue fluids


obtain cortisol level


salt tabs





MV

## 2019-06-02 VITALS — HEART RATE: 60 BPM

## 2019-06-02 LAB
ANION GAP SERPL CALC-SCNC: 5 MMOL/L (ref 8–16)
BUN SERPL-MCNC: 11 MG/DL (ref 7–18)
CALCIUM SERPL-MCNC: 8.4 MG/DL (ref 8.5–10.1)
CHLORIDE SERPL-SCNC: 91 MMOL/L (ref 98–107)
CO2 SERPL-SCNC: 31 MMOL/L (ref 21–32)
CREAT SERPL-MCNC: 0.9 MG/DL (ref 0.55–1.3)
GLUCOSE SERPL-MCNC: 81 MG/DL (ref 74–106)
MAGNESIUM SERPL-MCNC: 2.6 MG/DL (ref 1.8–2.4)
PHOSPHATE SERPL-MCNC: 3.6 MG/DL (ref 2.5–4.9)
POTASSIUM SERPLBLD-SCNC: 5.1 MMOL/L (ref 3.5–5.1)
SODIUM SERPL-SCNC: 127 MMOL/L (ref 136–145)

## 2019-06-02 RX ADMIN — EZETIMIBE SCH MG: 10 TABLET ORAL at 09:42

## 2019-06-02 RX ADMIN — HEPARIN SODIUM SCH UNIT: 5000 INJECTION, SOLUTION INTRAVENOUS; SUBCUTANEOUS at 17:38

## 2019-06-02 RX ADMIN — SODIUM CHLORIDE TAB 1 GM SCH GM: 1 TAB at 09:43

## 2019-06-02 RX ADMIN — HEPARIN SODIUM SCH UNIT: 5000 INJECTION, SOLUTION INTRAVENOUS; SUBCUTANEOUS at 02:40

## 2019-06-02 RX ADMIN — SODIUM CHLORIDE SCH MLS/HR: 9 INJECTION, SOLUTION INTRAVENOUS at 16:00

## 2019-06-02 RX ADMIN — SODIUM CHLORIDE TAB 1 GM SCH GM: 1 TAB at 22:51

## 2019-06-02 RX ADMIN — VALSARTAN SCH MG: 160 TABLET, FILM COATED ORAL at 09:41

## 2019-06-02 RX ADMIN — DUTASTERIDE SCH MG: 0.5 CAPSULE, LIQUID FILLED ORAL at 09:42

## 2019-06-02 RX ADMIN — HEPARIN SODIUM SCH UNIT: 5000 INJECTION, SOLUTION INTRAVENOUS; SUBCUTANEOUS at 09:48

## 2019-06-02 RX ADMIN — TAMSULOSIN HYDROCHLORIDE SCH MG: 0.4 CAPSULE ORAL at 09:41

## 2019-06-02 RX ADMIN — ASPIRIN 81 MG SCH MG: 81 TABLET ORAL at 09:41

## 2019-06-02 RX ADMIN — METOPROLOL TARTRATE SCH MG: 25 TABLET, FILM COATED ORAL at 09:41

## 2019-06-02 NOTE — PN
Progress Note (short form)





- Note


Progress Note: 





RENAL


pt is awake and alert


comfortable





  Last Vital Signs











Temp Pulse Resp BP Pulse Ox


 


 97.2 F L  60   20   113/55 L  92 L


 


 06/02/19 09:00  06/02/19 09:00  06/02/19 09:00  06/02/19 09:00  06/02/19 09:00

















lungs clear 


cvs s1s2 rr


abd soft 


ext no edema


neuro a+ox3


 CBC, BMP





 06/01/19 06:00 





 06/02/19 05:30 











 Current Medications











Generic Name Dose Route Start Last Admin





  Trade Name Freq  PRN Reason Stop Dose Admin


 


Acetaminophen  650 mg  06/02/19 11:09  





  Tylenol -  PO   





  Q6H PRN   





  PAIN   





     





     





     


 


Aspirin  81 mg  06/03/19 10:00  





  Asa -  PO   





  DAILY FANY   





     





     





     





     


 


Atorvastatin Calcium  20 mg  06/02/19 22:00  





  Lipitor -  PO   





  HS FANY   





     





     





     





     


 


Dutasteride  0.5 mg  06/03/19 10:00  





  Avodart -  PO   





  DAILY FANY   





     





     





     





     


 


Ezetimibe  10 mg  06/03/19 10:00  





  Zetia -  PO   





  DAILY FANY   





     





     





     





     


 


Guaifenesin/Codeine Phosphate  10 ml  06/02/19 11:09  





  Robitussin Ac -  PO   





  Q8H PRN   





  COUGH   





     





     





     


 


Heparin Sodium (Porcine)  5,000 unit  06/02/19 18:00  





  Heparin -  SQ   





  Q8H-IV FANY   





     





     





     





     


 


Sodium Chloride  1,000 mls @ 75 mls/hr  06/02/19 11:09  





  Normal Saline -  IV   





  ASDIR FANY   





     





     





     





     


 


Metoprolol Tartrate  25 mg  06/03/19 10:00  





  Lopressor -  PO   





  DAILY FANY   





     





     





     





     


 


Sodium Chloride  1 gm  06/02/19 22:00  





  Sodium Chloride Tablet -  PO   





  BID FANY   





     





     





     





     


 


Tamsulosin HCl  0.4 mg  06/03/19 08:30  





  Flomax -  PO   





  DAILY@0830 FANY   





     





     





     





     


 


Valsartan  160 mg  06/03/19 10:00  





  Diovan -  PO   





  DAILY FANY   





     





     





     





     











IMPRESSION


hypoosmolar hyponatremia slightly improved with ivf


obesity


hypocalcemia





PLAN


continue fluids


await cortisol level.


salt tabs


would consider ct of head, chest and abdomen if sodium still not better





MV

## 2019-06-02 NOTE — PN
Progress Note, Physician


Chief Complaint: 


Mr Price complains of cough today that is productive. Denies cp, sob, n/

v.





- Current Medication List


Current Medications: 


Active Medications





Acetaminophen (Tylenol -)  650 mg PO Q6H PRN


   PRN Reason: PAIN


Aspirin (Asa -)  81 mg PO DAILY [facility]


   Last Admin: 06/02/19 09:41 Dose:  81 mg


Atorvastatin Calcium (Lipitor -)  20 mg PO HS [facility]


   Last Admin: 06/01/19 21:46 Dose:  20 mg


Dutasteride (Avodart -)  0.5 mg PO DAILY [facility]


   Last Admin: 06/02/19 09:42 Dose:  0.5 mg


Ezetimibe (Zetia -)  10 mg PO DAILY [facility]


   Last Admin: 06/02/19 09:42 Dose:  10 mg


Guaifenesin/Codeine Phosphate (Robitussin Ac -)  10 ml PO Q8H PRN


   PRN Reason: COUGH


   Last Admin: 06/01/19 18:12 Dose:  10 ml


Heparin Sodium (Porcine) (Heparin -)  5,000 unit SQ Q8H-IV [facility]


   Last Admin: 06/02/19 09:48 Dose:  5,000 unit


Sodium Chloride (Normal Saline -)  1,000 mls @ 75 mls/hr IV ASDIR [facility]


   Last Admin: 06/01/19 18:13 Dose:  75 mls/hr


Metoprolol Tartrate (Lopressor -)  25 mg PO DAILY [facility]


   Last Admin: 06/02/19 09:41 Dose:  25 mg


Sodium Chloride (Sodium Chloride Tablet -)  1 gm PO BID [facility]


   Last Admin: 06/02/19 09:43 Dose:  1 gm


Tamsulosin HCl (Flomax -)  0.4 mg PO DAILY@0830 [facility]


   Last Admin: 06/02/19 09:41 Dose:  0.4 mg


Valsartan (Diovan -)  160 mg PO DAILY [facility]


   Last Admin: 06/02/19 09:41 Dose:  160 mg











- Objective


Vital Signs: 


 Vital Signs











Temperature  36.2 C L  06/02/19 09:00


 


Pulse Rate  60   06/02/19 09:00


 


Respiratory Rate  20   06/02/19 09:00


 


Blood Pressure  113/55 L  06/02/19 09:00


 


O2 Sat by Pulse Oximetry (%)  92 L  06/02/19 09:00











Constitutional: Yes: Well Nourished, No Distress, Calm


Cardiovascular: Yes: Regular Rate and Rhythm.  No: Gallop, Murmur, Rub


Respiratory: Yes: Regular, CTA Bilaterally.  No: Rales, Rhonchi, Wheezes


Gastrointestinal: Yes: Normal Bowel Sounds, Soft.  No: Distention, Tenderness


Extremities: Yes: WNL


Edema: No


Labs: 


 CBC, BMP





 06/01/19 06:00 





 06/02/19 05:30 











Problem List





- Problems


(1) Hyponatremia


Code(s): E87.1 - HYPO-OSMOLALITY AND HYPONATREMIA   





(2) Vomiting


Code(s): R11.10 - VOMITING, UNSPECIFIED   





(3) CAD (coronary atherosclerotic disease)


Code(s): I25.10 - ATHSCL HEART DISEASE OF NATIVE CORONARY ARTERY W/O ANG PCTRS 

  


Qualifiers: 


   Coronary Disease-Associated Artery/Lesion type: unspecified vessel or lesion 

type   Native vs. transplanted heart: unspecified whether native or 

transplanted heart   Associated angina: without angina   Qualified Code(s): 

I25.10 - Atherosclerotic heart disease of native coronary artery without angina 

pectoris   





(4) HLD (hyperlipidemia)


Code(s): E78.5 - HYPERLIPIDEMIA, UNSPECIFIED   


Qualifiers: 


   Hyperlipidemia type: unspecified   Qualified Code(s): E78.5 - Hyperlipidemia

, unspecified   





(5) HTN (hypertension)


Code(s): I10 - ESSENTIAL (PRIMARY) HYPERTENSION   


Qualifiers: 


   Hypertension type: essential hypertension   Qualified Code(s): I10 - 

Essential (primary) hypertension   





Assessment/Plan





(1) Hyponatremia


Assessment/Plan: 


-case d/w Dr Valle


-continue NS


-continue salt tabs


-improving


-transfer to med/surg


Code(s): E87.1 - HYPO-OSMOLALITY AND HYPONATREMIA   





(2) Coughing


Assessment/Plan: 


-chest x-ray clear


-lung exam clear


-follow CT scan


-continue cough suppressant


Code(s):





(3) CAD (coronary atherosclerotic disease)


Assessment/Plan: 


-quiescent


-continue aspirin, arb, metoprolol, statin, and zetia


Code(s): I25.10 - ATHSCL HEART DISEASE OF NATIVE CORONARY ARTERY W/O ANG PCTRS 

  


Qualifiers: 


   Coronary Disease-Associated Artery/Lesion type: unspecified vessel or lesion 

type   Native vs. transplanted heart: unspecified whether native or 

transplanted heart   Associated angina: without angina   Qualified Code(s): 

I25.10 - Atherosclerotic heart disease of native coronary artery without angina 

pectoris   





(4) HLD (hyperlipidemia)


Assessment/Plan: 


-continue statin and zetia


Code(s): E78.5 - HYPERLIPIDEMIA, UNSPECIFIED   


Qualifiers: 


   Hyperlipidemia type: unspecified   Qualified Code(s): E78.5 - Hyperlipidemia

, unspecified   





(5) HTN (hypertension)


Assessment/Plan: 


-controlled


Code(s): I10 - ESSENTIAL (PRIMARY) HYPERTENSION   


Qualifiers: 


   Hypertension type: essential hypertension   Qualified Code(s): I10 - 

Essential (primary) hypertension

## 2019-06-03 VITALS — TEMPERATURE: 98.6 F | SYSTOLIC BLOOD PRESSURE: 97 MMHG | DIASTOLIC BLOOD PRESSURE: 53 MMHG

## 2019-06-03 LAB
ANION GAP SERPL CALC-SCNC: 5 MMOL/L (ref 8–16)
BASOPHILS # BLD: 0.6 % (ref 0–2)
BUN SERPL-MCNC: 10 MG/DL (ref 7–18)
CALCIUM SERPL-MCNC: 8.2 MG/DL (ref 8.5–10.1)
CHLORIDE SERPL-SCNC: 95 MMOL/L (ref 98–107)
CO2 SERPL-SCNC: 31 MMOL/L (ref 21–32)
CREAT SERPL-MCNC: 1 MG/DL (ref 0.55–1.3)
DEPRECATED RDW RBC AUTO: 13.3 % (ref 11.9–15.9)
EOSINOPHIL # BLD: 4 % (ref 0–4.5)
GLUCOSE SERPL-MCNC: 94 MG/DL (ref 74–106)
HCT VFR BLD CALC: 43 % (ref 35.4–49)
HGB BLD-MCNC: 14.9 GM/DL (ref 11.7–16.9)
LYMPHOCYTES # BLD: 38.8 % (ref 8–40)
MAGNESIUM SERPL-MCNC: 2.3 MG/DL (ref 1.8–2.4)
MCH RBC QN AUTO: 30.4 PG (ref 25.7–33.7)
MCHC RBC AUTO-ENTMCNC: 34.6 G/DL (ref 32–35.9)
MCV RBC: 87.8 FL (ref 80–96)
MONOCYTES # BLD AUTO: 10.3 % (ref 3.8–10.2)
NEUTROPHILS # BLD: 46.3 % (ref 42.8–82.8)
PHOSPHATE SERPL-MCNC: 3.7 MG/DL (ref 2.5–4.9)
PLATELET # BLD AUTO: 231 K/MM3 (ref 134–434)
PMV BLD: 7 FL (ref 7.5–11.1)
POTASSIUM SERPLBLD-SCNC: 4.5 MMOL/L (ref 3.5–5.1)
RBC # BLD AUTO: 4.9 M/MM3 (ref 4–5.6)
SODIUM SERPL-SCNC: 131 MMOL/L (ref 136–145)
WBC # BLD AUTO: 4.3 K/MM3 (ref 4–10)

## 2019-06-03 RX ADMIN — HEPARIN SODIUM SCH UNIT: 5000 INJECTION, SOLUTION INTRAVENOUS; SUBCUTANEOUS at 02:29

## 2019-06-03 RX ADMIN — HEPARIN SODIUM SCH UNIT: 5000 INJECTION, SOLUTION INTRAVENOUS; SUBCUTANEOUS at 10:00

## 2019-06-03 RX ADMIN — SODIUM CHLORIDE TAB 1 GM SCH GM: 1 TAB at 10:00

## 2019-06-03 RX ADMIN — SODIUM CHLORIDE SCH MLS/HR: 9 INJECTION, SOLUTION INTRAVENOUS at 10:08

## 2019-06-03 NOTE — PN
Teaching Attending Note


Name of Resident: Katherine Monte





ATTENDING PHYSICIAN STATEMENT





I saw and evaluated the patient.


I reviewed the resident's note and discussed the case with the resident.


I agree with the resident's findings and plan as documented.








SUBJECTIVE: Mr Price is without complaint.








OBJECTIVE:


Gen: nad


Pulm: ctab


CV: rrr


Abd: +bs, s/nt/nd


Ext: no c/c/e








HC: please refer to discharge summary but in short Mr Villela is a 

pleasant 63 year old male who came in with symptomatic hyponatremia. He was 

admitted to the hospital and seen by nephrology. He was started on NS and 

improved. Salt tablets were added and he continued to improve. He is currently 

safe for discharge home on salt tablets with close follow up with Dr Fair.








Problem List





- Problems


(1) Hyponatremia


Code(s): E87.1 - HYPO-OSMOLALITY AND HYPONATREMIA   





(2) Vomiting


Code(s): R11.10 - VOMITING, UNSPECIFIED   





(3) CAD (coronary atherosclerotic disease)


Code(s): I25.10 - ATHSCL HEART DISEASE OF NATIVE CORONARY ARTERY W/O ANG PCTRS 

  


Qualifiers: 


   Coronary Disease-Associated Artery/Lesion type: unspecified vessel or lesion 

type   Native vs. transplanted heart: unspecified whether native or 

transplanted heart   Associated angina: without angina   Qualified Code(s): 

I25.10 - Atherosclerotic heart disease of native coronary artery without angina 

pectoris   





(4) HLD (hyperlipidemia)


Code(s): E78.5 - HYPERLIPIDEMIA, UNSPECIFIED   


Qualifiers: 


   Hyperlipidemia type: unspecified   Qualified Code(s): E78.5 - Hyperlipidemia

, unspecified   





(5) HTN (hypertension)


Code(s): I10 - ESSENTIAL (PRIMARY) HYPERTENSION   


Qualifiers: 


   Hypertension type: essential hypertension   Qualified Code(s): I10 - 

Essential (primary) hypertension

## 2019-06-03 NOTE — DS
Physical Exam: 


SUBJECTIVE: Patient seen and examined


resting in bed nad, no acute events, afebrile hemodynamicallys table. + chronic 

cough. 





OBJECTIVE:





 Vital Signs











 Period  Temp  Pulse  Resp  BP Sys/Faria  Pulse Ox


 


 Last 24 Hr  97.7 F-98.6 F  60-60  20-20  /53-75  97-97








PHYSICAL EXAM





GENERAL: The patient is awake, alert, and fully oriented, in no acute distress.


HEAD: Normal with no signs of trauma.


EYES: PERRL, extraocular movements intact, sclera anicteric, conjunctiva clear. 


ENT: moist mucous membranes.


NECK: supple. 


LUNGS: Breath sounds equal, clear to auscultation bilaterally


HEART: Regular rate and rhythm, S1, S2 


ABDOMEN: Soft, nontender, nondistended, normoactive bowel sounds


NEUROLOGICAL: Cranial nerves II through XII grossly intact. Normal speech, gait 

not observed.


PSYCH: Normal mood, normal affect.


SKIN: Warm, dry





LABS


 Laboratory Results - last 24 hr











  06/03/19 06/03/19





  06:15 06:15


 


WBC  4.3 


 


RBC  4.90 


 


Hgb  14.9 


 


Hct  43.0 


 


MCV  87.8 


 


MCH  30.4 


 


MCHC  34.6 


 


RDW  13.3 


 


Plt Count  231 


 


MPV  7.0 L 


 


Absolute Neuts (auto)  2.0 


 


Neutrophils %  46.3 


 


Lymphocytes %  38.8 


 


Monocytes %  10.3 H 


 


Eosinophils %  4.0 


 


Basophils %  0.6 


 


Nucleated RBC %  0 


 


Sodium   131 L


 


Potassium   4.5


 


Chloride   95 L


 


Carbon Dioxide   31


 


Anion Gap   5 L


 


BUN   10


 


Creatinine   1.0


 


Est GFR (CKD-EPI)AfAm   92.42


 


Est GFR (CKD-EPI)NonAf   79.75


 


Random Glucose   94


 


Calcium   8.2 L


 


Phosphorus   3.7


 


Magnesium   2.3











HOSPITAL COURSE:





Date of Admission:05/31/19


This is a 62 yo M with PMH of hyponatremia HTN, HLD, MI (2007) s/p PPM and 

stents admitted for severe occipital h/a + NBNB v x 1 for 2d, found to be 

hyponatremic 120. Previous episode occurred 1 yr ago and since then patients 

lasix dose has been lowered to 20 po every other day. of note he reports uri 

over the past week. hyponatremia responded to saline and salt tabs, on DC Na 

131. 


Hyponatremia workup revealed hypoosmolar serum, hyperosm urine, elevated Urine 

Na  (after 1L NS); differential causes including diuretic, mineralcorticoid 

deficiency, hypothyroidism, siadh/cerebral salt wasting. TFTs were normal, 

serum cortisol level is still pending. patient instructed to take salt tablets 

at home, continue current lasix dose and f/u with nephrology. 





Date of Discharge: 06/03/19











Minutes to complete discharge: 35





Discharge Summary


Reason For Visit: HYPONATREMIA


Condition: Stable





- Instructions


Diet, Activity, Other Instructions: 


You were admitted to the hospital with a headache that was caused by low Sodium 

in your blood. It might have been caused by your recent upper respiratory 

infection in combination with Lasix but other causes are also possible





Your sodium today is 131 which is acceptable and much better. 


Please continue taking salt tablets at home daily (prescription sent to your 

pharmacy) 


continue all of your other home medications as before including Lasix 20 mg 


Follow up with your primary doctor and with a nephrologist Dr Fair within 1 

week. 


If symptoms resume please return to the hospital 








Referrals: 


Nusrat Hudson MD [Primary Care Provider] - 


Rafaela Fair MD [Staff Physician] - 


Disposition: HOME





- Home Medications


Comprehensive Discharge Medication List: 


Ambulatory Orders





Aspirin [ASA -] 81 mg PO DAILY 06/23/18 


Candesartan Cilexetil [Atacand -] 16 mg PO DAILY 06/23/18 


Ezetimibe [Zetia] 10 mg PO DAILY 06/23/18 


Metoprolol Tartrate [Lopressor -] 25 mg PO DAILY 06/23/18 


Pitavastatin Calcium [Livalo] 4 mg PO DAILY 07/28/18 


Dutasteride 0.5 mg PO DAILY 07/30/18 


Furosemide 20 mg PO DAILY 07/30/18 


Miscellaneous Drug Not In Syst [Outpatient Lab Test] 1 each  ASDIR #1 misc 07/ 30/18 


Tamsulosin HCl 0.4 mg PO DAILY 07/30/18 


Sodium Chloride Tablet - 1 gm PO BID #60 tablet 06/03/19 











Problem List





- Problems


(1) Hyponatremia


Code(s): E87.1 - HYPO-OSMOLALITY AND HYPONATREMIA   





(2) Vomiting


Code(s): R11.10 - VOMITING, UNSPECIFIED   





(3) CAD (coronary atherosclerotic disease)


Code(s): I25.10 - ATHSCL HEART DISEASE OF NATIVE CORONARY ARTERY W/O ANG PCTRS 

  


Qualifiers: 


   Coronary Disease-Associated Artery/Lesion type: unspecified vessel or lesion 

type   Native vs. transplanted heart: unspecified whether native or 

transplanted heart   Associated angina: without angina   Qualified Code(s): 

I25.10 - Atherosclerotic heart disease of native coronary artery without angina 

pectoris   





(4) HLD (hyperlipidemia)


Code(s): E78.5 - HYPERLIPIDEMIA, UNSPECIFIED   


Qualifiers: 


   Hyperlipidemia type: unspecified   Qualified Code(s): E78.5 - Hyperlipidemia

, unspecified   





(5) HTN (hypertension)


Code(s): I10 - ESSENTIAL (PRIMARY) HYPERTENSION   


Qualifiers: 


   Hypertension type: essential hypertension   Qualified Code(s): I10 - 

Essential (primary) hypertension   





(6) Obesity (BMI 30-39.9)


Code(s): E66.9 - OBESITY, UNSPECIFIED   





(7) Weakness


Code(s): R53.1 - WEAKNESS   


This patient is new to me today: Yes


Date on this admission: 06/03/19


Emergency Visit: Yes


ED Registration Date: 05/31/19


Care time: The patient presented to the Emergency Department on the above date 

and was hospitalized for further evaluation of their emergent condition.


Critical Care patient: No





- Discharge Referral


Referred to [facility] No

## 2019-06-03 NOTE — PN
Physical Exam: 


SUBJECTIVE: Patient seen and examined








OBJECTIVE:





 Vital Signs











 Period  Temp  Pulse  Resp  BP Sys/Faria  Pulse Ox


 


 Last 24 Hr  97.7 F-98 F  60-74  18-20  /60-75  95-97











GENERAL: The patient is awake, alert, and fully oriented, in no acute distress.


HEAD: Normal with no signs of trauma.


EYES: PERRL, extraocular movements intact, sclera anicteric, conjunctiva clear. 

No ptosis. 


ENT: Ears normal, nares patent, oropharynx clear without exudates, moist mucous 


membranes.


NECK: Trachea midline, full range of motion, supple. 


LUNGS: Breath sounds equal, clear to auscultation bilaterally, no wheezes, no 

crackles, no 


accessory muscle use. 


HEART: Regular rate and rhythm, S1, S2 without murmur, rub or gallop.


ABDOMEN: Soft, nontender, nondistended, normoactive bowel sounds, no guarding, 

no 


rebound, no hepatosplenomegaly, no masses.


EXTREMITIES: 2+ pulses, warm, well-perfused, no edema. 


NEUROLOGICAL: Cranial nerves II through XII grossly intact. Normal speech, gait 

not 


observed.


PSYCH: Normal mood, normal affect.


SKIN: Warm, dry, normal turgor, no rashes or lesions noted














 Laboratory Results - last 24 hr











  06/03/19 06/03/19





  06:15 06:15


 


WBC  4.3 


 


RBC  4.90 


 


Hgb  14.9 


 


Hct  43.0 


 


MCV  87.8 


 


MCH  30.4 


 


MCHC  34.6 


 


RDW  13.3 


 


Plt Count  231 


 


MPV  7.0 L 


 


Absolute Neuts (auto)  2.0 


 


Neutrophils %  46.3 


 


Lymphocytes %  38.8 


 


Monocytes %  10.3 H 


 


Eosinophils %  4.0 


 


Basophils %  0.6 


 


Nucleated RBC %  0 


 


Sodium   131 L


 


Potassium   4.5


 


Chloride   95 L


 


Carbon Dioxide   31


 


Anion Gap   5 L


 


BUN   10


 


Creatinine   1.0


 


Est GFR (CKD-EPI)AfAm   92.42


 


Est GFR (CKD-EPI)NonAf   79.75


 


Random Glucose   94


 


Calcium   8.2 L


 


Phosphorus   3.7


 


Magnesium   2.3








Active Medications











Generic Name Dose Route Start Last Admin





  Trade Name Freq  PRN Reason Stop Dose Admin


 


Acetaminophen  650 mg  06/02/19 11:09  





  Tylenol -  PO   





  Q6H PRN   





  PAIN   





     





     





     


 


Aspirin  81 mg  06/03/19 10:00  06/03/19 10:01





  Asa -  PO   81 mg





  DAILY FANY   Administration





     





     





     





     


 


Atorvastatin Calcium  20 mg  06/02/19 22:00  06/02/19 21:22





  Lipitor -  PO   20 mg





  HS FANY   Administration





     





     





     





     


 


Dutasteride  0.5 mg  06/03/19 10:00  06/03/19 10:02





  Avodart -  PO   0.5 mg





  DAILY FANY   Administration





     





     





     





     


 


Ezetimibe  10 mg  06/03/19 10:00  06/03/19 10:01





  Zetia -  PO   10 mg





  DAILY FANY   Administration





     





     





     





     


 


Furosemide  20 mg  06/03/19 10:00  06/03/19 10:01





  Lasix -  PO   20 mg





  DAILY FANY   Administration





     





     





     





     


 


Guaifenesin/Codeine Phosphate  10 ml  06/02/19 11:09  





  Robitussin Ac -  PO   





  Q8H PRN   





  COUGH   





     





     





     


 


Heparin Sodium (Porcine)  5,000 unit  06/02/19 18:00  06/03/19 10:00





  Heparin -  SQ   5,000 unit





  Q8H-IV FANY   Administration





     





     





     





     


 


Sodium Chloride  1,000 mls @ 75 mls/hr  06/02/19 11:09  06/03/19 10:08





  Normal Saline -  IV   75 mls/hr





  ASDIR FANY   Administration





     





     





     





     


 


Metoprolol Tartrate  25 mg  06/03/19 10:00  06/03/19 10:01





  Lopressor -  PO   25 mg





  DAILY FANY   Administration





     





     





     





     


 


Sodium Chloride  1 gm  06/02/19 22:00  06/03/19 10:00





  Sodium Chloride Tablet -  PO   1 gm





  BID FANY   Administration





     





     





     





     


 


Tamsulosin HCl  0.4 mg  06/03/19 08:30  06/03/19 10:01





  Flomax -  PO   0.4 mg





  DAILY@0830 FANY   Administration





     





     





     





     


 


Valsartan  160 mg  06/03/19 10:00  06/03/19 10:02





  Diovan -  PO   160 mg





  DAILY FANY   Administration





     





     





     





     











ASSESSMENT/PLAN:


This is a 62 yo M with PMH of HTN, HLD, MI (2007) s/p PPM and stents cc severe 

occipital h/a + NBNB v x 1 for 2d. 


Hyponatremia 


Cough


HTN


HLD


CAD


BPH


-hyponatremic on previous admission, on lasix 20 PO at home 


-Na 131 improving 


-hypoosmolar serum, hyperosm urine, elevated Urine Na  (after 1L NS); 

differential causes including diuretic, mineralcorticoid deficiency, 

hypothyroidism, siadh/cerebral salt wasting


-TFTs wnl


-f/u cortisol level


-NS@75, salt tabs


-renal recs appreciated 


-ct of head, chest and abdomen w/o contrast: no acute process in head, abd/

pelvis, possible infiltrate LLL vs atelectasis. 


-cough may be allregic, continue robitussin 


-continue asa, valsartan, metoprolol, lasix, lipitor, and zetia


-continue flomax

## 2019-06-03 NOTE — PN
Progress Note, Physician


History of Present Illness: 





Pt seen and examined at bedside. He is awake and alert. He denies shortness of 

breath. 





- Current Medication List


Current Medications: 


Active Medications





Acetaminophen (Tylenol -)  650 mg PO Q6H PRN


   PRN Reason: PAIN


Aspirin (Asa -)  81 mg PO DAILY [facility]


   Last Admin: 06/03/19 10:01 Dose:  81 mg


Atorvastatin Calcium (Lipitor -)  20 mg PO HS [facility]


   Last Admin: 06/02/19 21:22 Dose:  20 mg


Dutasteride (Avodart -)  0.5 mg PO DAILY [facility]


   Last Admin: 06/03/19 10:02 Dose:  0.5 mg


Ezetimibe (Zetia -)  10 mg PO DAILY [facility]


   Last Admin: 06/03/19 10:01 Dose:  10 mg


Furosemide (Lasix -)  20 mg PO DAILY [facility]


   Last Admin: 06/03/19 10:01 Dose:  20 mg


Guaifenesin/Codeine Phosphate (Robitussin Ac -)  10 ml PO Q8H PRN


   PRN Reason: COUGH


Heparin Sodium (Porcine) (Heparin -)  5,000 unit SQ Q8H-IV [facility]


   Last Admin: 06/03/19 10:00 Dose:  5,000 unit


Sodium Chloride (Normal Saline -)  1,000 mls @ 75 mls/hr IV ASDIR [facility]


   Last Admin: 06/03/19 10:08 Dose:  75 mls/hr


Metoprolol Tartrate (Lopressor -)  25 mg PO DAILY [facility]


   Last Admin: 06/03/19 10:01 Dose:  25 mg


Sodium Chloride (Sodium Chloride Tablet -)  1 gm PO BID [facility]


   Last Admin: 06/03/19 10:00 Dose:  1 gm


Tamsulosin HCl (Flomax -)  0.4 mg PO DAILY@0830 [facility]


   Last Admin: 06/03/19 10:01 Dose:  0.4 mg


Valsartan (Diovan -)  160 mg PO DAILY [facility]


   Last Admin: 06/03/19 10:02 Dose:  160 mg











- Objective


Vital Signs: 


 Vital Signs











Temperature  97.7 F   06/03/19 09:00


 


Pulse Rate  60   06/03/19 09:00


 


Respiratory Rate  20   06/03/19 09:00


 


Blood Pressure  128/68   06/03/19 09:00


 


O2 Sat by Pulse Oximetry (%)  97   06/03/19 09:00











Constitutional: Yes: Calm


Eyes: Yes: Conjunctiva Clear


HENT: Yes: Atraumatic


Neck: Yes: Supple


Cardiovascular: Yes: S1, S2


Respiratory: Yes: CTA Bilaterally


Gastrointestinal: Yes: Soft


Genitourinary: Yes: WNL


Musculoskeletal: Yes: WNL


Edema: Yes


Edema: LLE: Trace, RLE: Trace


Neurological: Yes: Oriented


Psychiatric: Yes: Oriented


Labs: 


 CBC, BMP





 06/03/19 06:15 





 06/03/19 06:15 











Problem List





- Problems


(1) Vomiting


Code(s): R11.10 - VOMITING, UNSPECIFIED   





(2) Hyponatremia


Code(s): E87.1 - HYPO-OSMOLALITY AND HYPONATREMIA   





(3) CAD (coronary atherosclerotic disease)


Code(s): I25.10 - ATHSCL HEART DISEASE OF NATIVE CORONARY ARTERY W/O ANG PCTRS 

  


Qualifiers: 


   Coronary Disease-Associated Artery/Lesion type: unspecified vessel or lesion 

type   Native vs. transplanted heart: unspecified whether native or 

transplanted heart   Associated angina: without angina   Qualified Code(s): 

I25.10 - Atherosclerotic heart disease of native coronary artery without angina 

pectoris   





(4) HLD (hyperlipidemia)


Code(s): E78.5 - HYPERLIPIDEMIA, UNSPECIFIED   


Qualifiers: 


   Hyperlipidemia type: unspecified   Qualified Code(s): E78.5 - Hyperlipidemia

, unspecified   





(5) HTN (hypertension)


Code(s): I10 - ESSENTIAL (PRIMARY) HYPERTENSION   


Qualifiers: 


   Hypertension type: essential hypertension   Qualified Code(s): I10 - 

Essential (primary) hypertension   





Assessment/Plan


 Current Medications











Generic Name Dose Route Start Last Admin





  Trade Name Freq  PRN Reason Stop Dose Admin


 


Acetaminophen  650 mg  06/02/19 11:09  





  Tylenol -  PO   





  Q6H PRN   





  PAIN   





     





     





     


 


Aspirin  81 mg  06/03/19 10:00  06/03/19 10:01





  Asa -  PO   81 mg





  DAILY FANY   Administration





     





     





     





     


 


Atorvastatin Calcium  20 mg  06/02/19 22:00  06/02/19 21:22





  Lipitor -  PO   20 mg





  HS FANY   Administration





     





     





     





     


 


Dutasteride  0.5 mg  06/03/19 10:00  06/03/19 10:02





  Avodart -  PO   0.5 mg





  DAILY FANY   Administration





     





     





     





     


 


Ezetimibe  10 mg  06/03/19 10:00  06/03/19 10:01





  Zetia -  PO   10 mg





  DAILY FANY   Administration





     





     





     





     


 


Furosemide  20 mg  06/03/19 10:00  06/03/19 10:01





  Lasix -  PO   20 mg





  DAILY FANY   Administration





     





     





     





     


 


Guaifenesin/Codeine Phosphate  10 ml  06/02/19 11:09  





  Robitussin Ac -  PO   





  Q8H PRN   





  COUGH   





     





     





     


 


Heparin Sodium (Porcine)  5,000 unit  06/02/19 18:00  06/03/19 10:00





  Heparin -  SQ   5,000 unit





  Q8H-IV FANY   Administration





     





     





     





     


 


Sodium Chloride  1,000 mls @ 75 mls/hr  06/02/19 11:09  06/03/19 10:08





  Normal Saline -  IV   75 mls/hr





  ASDIR FANY   Administration





     





     





     





     


 


Metoprolol Tartrate  25 mg  06/03/19 10:00  06/03/19 10:01





  Lopressor -  PO   25 mg





  DAILY FANY   Administration





     





     





     





     


 


Sodium Chloride  1 gm  06/02/19 22:00  06/03/19 10:00





  Sodium Chloride Tablet -  PO   1 gm





  BID FANY   Administration





     





     





     





     


 


Tamsulosin HCl  0.4 mg  06/03/19 08:30  06/03/19 10:01





  Flomax -  PO   0.4 mg





  DAILY@0830 FANY   Administration





     





     





     





     


 


Valsartan  160 mg  06/03/19 10:00  06/03/19 10:02





  Diovan -  PO   160 mg





  DAILY FANY   Administration





     





     





     





     











Impression


1. hyponatremia


2. vomiting


3. CAD


4. HTN


5. HLD





Plan


- cont saline


- sodium is improving with saline


- repeat labs in am


- restrict free water, discussed free water intake


with pt and wife


- monitor sodium levels





Dr Fair

## 2021-12-26 ENCOUNTER — HOSPITAL ENCOUNTER (INPATIENT)
Dept: HOSPITAL 74 - JER | Age: 66
LOS: 1 days | Discharge: LEFT BEFORE DISCHARGE | DRG: 641 | End: 2021-12-27
Attending: NURSE PRACTITIONER | Admitting: HOSPITALIST
Payer: COMMERCIAL

## 2021-12-26 VITALS — BODY MASS INDEX: 37.4 KG/M2

## 2021-12-26 DIAGNOSIS — Z95.0: ICD-10-CM

## 2021-12-26 DIAGNOSIS — K21.9: ICD-10-CM

## 2021-12-26 DIAGNOSIS — K44.9: ICD-10-CM

## 2021-12-26 DIAGNOSIS — Z95.5: ICD-10-CM

## 2021-12-26 DIAGNOSIS — M79.10: ICD-10-CM

## 2021-12-26 DIAGNOSIS — E87.5: ICD-10-CM

## 2021-12-26 DIAGNOSIS — J90: ICD-10-CM

## 2021-12-26 DIAGNOSIS — J98.11: ICD-10-CM

## 2021-12-26 DIAGNOSIS — I25.10: ICD-10-CM

## 2021-12-26 DIAGNOSIS — E87.1: Primary | ICD-10-CM

## 2021-12-26 DIAGNOSIS — N40.0: ICD-10-CM

## 2021-12-26 DIAGNOSIS — I25.2: ICD-10-CM

## 2021-12-26 DIAGNOSIS — E78.5: ICD-10-CM

## 2021-12-26 DIAGNOSIS — K57.90: ICD-10-CM

## 2021-12-26 DIAGNOSIS — I10: ICD-10-CM

## 2021-12-26 LAB
ALBUMIN SERPL-MCNC: 3.5 G/DL (ref 3.4–5)
ALP SERPL-CCNC: 212 U/L (ref 45–117)
ALT SERPL-CCNC: 26 U/L (ref 13–61)
ANION GAP SERPL CALC-SCNC: 9 MMOL/L (ref 8–16)
AST SERPL-CCNC: 24 U/L (ref 15–37)
BASOPHILS # BLD: 0.8 % (ref 0–2)
BILIRUB SERPL-MCNC: 0.4 MG/DL (ref 0.2–1)
BUN SERPL-MCNC: 7.1 MG/DL (ref 7–18)
CALCIUM SERPL-MCNC: 8.5 MG/DL (ref 8.5–10.1)
CHLORIDE SERPL-SCNC: 79 MMOL/L (ref 98–107)
CO2 SERPL-SCNC: 28 MMOL/L (ref 21–32)
CREAT SERPL-MCNC: 0.7 MG/DL (ref 0.55–1.3)
DEPRECATED RDW RBC AUTO: 13.7 % (ref 11.9–15.9)
EOSINOPHIL # BLD: 3.6 % (ref 0–4.5)
GLUCOSE SERPL-MCNC: 86 MG/DL (ref 74–106)
HCT VFR BLD CALC: 37.8 % (ref 35.4–49)
HGB BLD-MCNC: 13.1 GM/DL (ref 11.7–16.9)
LIPASE SERPL-CCNC: 193 U/L (ref 73–393)
LYMPHOCYTES # BLD: 32.6 % (ref 8–40)
MAGNESIUM SERPL-MCNC: 2.1 MG/DL (ref 1.8–2.4)
MCH RBC QN AUTO: 30.2 PG (ref 25.7–33.7)
MCHC RBC AUTO-ENTMCNC: 34.6 G/DL (ref 32–35.9)
MCV RBC: 87.4 FL (ref 80–96)
MONOCYTES # BLD AUTO: 11.6 % (ref 3.8–10.2)
NEUTROPHILS # BLD: 51.4 % (ref 42.8–82.8)
PHOSPHATE SERPL-MCNC: 2.9 MG/DL (ref 2.5–4.9)
PLATELET # BLD AUTO: 306 10^3/UL (ref 134–434)
PMV BLD: 6.2 FL (ref 7.5–11.1)
PROT SERPL-MCNC: 7.2 G/DL (ref 6.4–8.2)
RBC # BLD AUTO: 4.33 M/MM3 (ref 4–5.6)
SODIUM SERPL-SCNC: 115 MMOL/L (ref 136–145)
WBC # BLD AUTO: 4.6 K/MM3 (ref 4–10)

## 2021-12-26 PROCEDURE — U0003 INFECTIOUS AGENT DETECTION BY NUCLEIC ACID (DNA OR RNA); SEVERE ACUTE RESPIRATORY SYNDROME CORONAVIRUS 2 (SARS-COV-2) (CORONAVIRUS DISEASE [COVID-19]), AMPLIFIED PROBE TECHNIQUE, MAKING USE OF HIGH THROUGHPUT TECHNOLOGIES AS DESCRIBED BY CMS-2020-01-R: HCPCS

## 2021-12-26 PROCEDURE — C9803 HOPD COVID-19 SPEC COLLECT: HCPCS

## 2021-12-26 PROCEDURE — U0005 INFEC AGEN DETEC AMPLI PROBE: HCPCS

## 2021-12-27 VITALS — HEART RATE: 60 BPM | DIASTOLIC BLOOD PRESSURE: 61 MMHG | SYSTOLIC BLOOD PRESSURE: 122 MMHG | TEMPERATURE: 98.5 F

## 2021-12-27 LAB
ALBUMIN SERPL-MCNC: 3.4 G/DL (ref 3.4–5)
ALP SERPL-CCNC: 202 U/L (ref 45–117)
ALT SERPL-CCNC: 27 U/L (ref 13–61)
ANION GAP SERPL CALC-SCNC: 7 MMOL/L (ref 8–16)
ANION GAP SERPL CALC-SCNC: 8 MMOL/L (ref 8–16)
APPEARANCE UR: CLEAR
AST SERPL-CCNC: 26 U/L (ref 15–37)
BACTERIA # UR AUTO: 1 /UL (ref 0–1359)
BASOPHILS # BLD: 1.1 % (ref 0–2)
BILIRUB SERPL-MCNC: 0.5 MG/DL (ref 0.2–1)
BILIRUB UR STRIP.AUTO-MCNC: NEGATIVE MG/DL
BUN SERPL-MCNC: 6.4 MG/DL (ref 7–18)
BUN SERPL-MCNC: 6.8 MG/DL (ref 7–18)
CALCIUM SERPL-MCNC: 8.3 MG/DL (ref 8.5–10.1)
CALCIUM SERPL-MCNC: 8.4 MG/DL (ref 8.5–10.1)
CASTS URNS QL MICRO: 0 /UL (ref 0–3.1)
CHLORIDE SERPL-SCNC: 84 MMOL/L (ref 98–107)
CHLORIDE SERPL-SCNC: 84 MMOL/L (ref 98–107)
CO2 SERPL-SCNC: 27 MMOL/L (ref 21–32)
CO2 SERPL-SCNC: 28 MMOL/L (ref 21–32)
COLOR UR: YELLOW
CREAT SERPL-MCNC: 0.8 MG/DL (ref 0.55–1.3)
CREAT SERPL-MCNC: 0.8 MG/DL (ref 0.55–1.3)
DEPRECATED RDW RBC AUTO: 13.4 % (ref 11.9–15.9)
EOSINOPHIL # BLD: 3.7 % (ref 0–4.5)
EPITH CASTS URNS QL MICRO: 0 /UL (ref 0–25.1)
GLUCOSE SERPL-MCNC: 78 MG/DL (ref 74–106)
GLUCOSE SERPL-MCNC: 81 MG/DL (ref 74–106)
HCT VFR BLD CALC: 40.1 % (ref 35.4–49)
HGB BLD-MCNC: 13.9 GM/DL (ref 11.7–16.9)
KETONES UR QL STRIP: NEGATIVE
LEUKOCYTE ESTERASE UR QL STRIP.AUTO: NEGATIVE
LYMPHOCYTES # BLD: 36.4 % (ref 8–40)
MAGNESIUM SERPL-MCNC: 2.3 MG/DL (ref 1.8–2.4)
MCH RBC QN AUTO: 30.4 PG (ref 25.7–33.7)
MCHC RBC AUTO-ENTMCNC: 34.7 G/DL (ref 32–35.9)
MCV RBC: 87.6 FL (ref 80–96)
MONOCYTES # BLD AUTO: 9.3 % (ref 3.8–10.2)
NEUTROPHILS # BLD: 49.5 % (ref 42.8–82.8)
NITRITE UR QL STRIP: NEGATIVE
PH UR: 6.5 [PH] (ref 5–8)
PHOSPHATE SERPL-MCNC: 3.4 MG/DL (ref 2.5–4.9)
PLATELET # BLD AUTO: 333 10^3/UL (ref 134–434)
PMV BLD: 6.1 FL (ref 7.5–11.1)
PROT SERPL-MCNC: 7.2 G/DL (ref 6.4–8.2)
PROT UR QL STRIP: NEGATIVE
PROT UR QL STRIP: NEGATIVE
RBC # BLD AUTO: 4.58 M/MM3 (ref 4–5.6)
RBC # BLD AUTO: 9 /UL (ref 0–23.9)
SODIUM SERPL-SCNC: 119 MMOL/L (ref 136–145)
SODIUM SERPL-SCNC: 119 MMOL/L (ref 136–145)
SP GR UR: 1.02 (ref 1.01–1.03)
URATE SERPL-SCNC: 2.4 MG/DL (ref 2.6–7.2)
UROBILINOGEN UR STRIP-MCNC: 0.2 MG/DL (ref 0.2–1)
WBC # BLD AUTO: 3.2 K/MM3 (ref 4–10)
WBC # UR AUTO: 1 /UL (ref 0–25.8)